# Patient Record
Sex: MALE | Race: WHITE | Employment: OTHER | URBAN - METROPOLITAN AREA
[De-identification: names, ages, dates, MRNs, and addresses within clinical notes are randomized per-mention and may not be internally consistent; named-entity substitution may affect disease eponyms.]

---

## 2017-04-18 ENCOUNTER — APPOINTMENT (OUTPATIENT)
Dept: CT IMAGING | Age: 72
End: 2017-04-18
Attending: EMERGENCY MEDICINE
Payer: MEDICARE

## 2017-04-18 ENCOUNTER — HOSPITAL ENCOUNTER (EMERGENCY)
Age: 72
Discharge: HOME | End: 2017-04-18
Attending: EMERGENCY MEDICINE
Payer: MEDICARE

## 2017-04-18 VITALS
OXYGEN SATURATION: 96 % | DIASTOLIC BLOOD PRESSURE: 74 MMHG | BODY MASS INDEX: 25.33 KG/M2 | WEIGHT: 187 LBS | HEART RATE: 69 BPM | HEIGHT: 72 IN | RESPIRATION RATE: 16 BRPM | SYSTOLIC BLOOD PRESSURE: 133 MMHG | TEMPERATURE: 98.2 F

## 2017-04-18 DIAGNOSIS — F10.920 ALCOHOL INTOXICATION, UNCOMPLICATED (HCC): Primary | ICD-10-CM

## 2017-04-18 DIAGNOSIS — T14.8XXA ABRASION: ICD-10-CM
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
## 2017-04-18 LAB
ALBUMIN SERPL BCP-MCNC: 3.7 G/DL (ref 3.5–5)
ALBUMIN/GLOB SERPL: 1.2 {RATIO} (ref 1.1–2.2)
ALP SERPL-CCNC: 64 U/L (ref 45–117)
ALT SERPL-CCNC: 27 U/L (ref 12–78)
ANION GAP BLD CALC-SCNC: 9 MMOL/L (ref 5–15)
AST SERPL W P-5'-P-CCNC: 25 U/L (ref 15–37)
BASOPHILS # BLD AUTO: 0 K/UL (ref 0–0.1)
BASOPHILS # BLD: 0 % (ref 0–1)
BILIRUB SERPL-MCNC: 1 MG/DL (ref 0.2–1)
BUN SERPL-MCNC: 18 MG/DL (ref 6–20)
BUN/CREAT SERPL: 15 (ref 12–20)
CALCIUM SERPL-MCNC: 8.9 MG/DL (ref 8.5–10.1)
CHLORIDE SERPL-SCNC: 106 MMOL/L (ref 97–108)
CO2 SERPL-SCNC: 27 MMOL/L (ref 21–32)
CREAT SERPL-MCNC: 1.21 MG/DL (ref 0.7–1.3)
EOSINOPHIL # BLD: 0.1 K/UL (ref 0–0.4)
EOSINOPHIL NFR BLD: 1 % (ref 0–7)
ERYTHROCYTE [DISTWIDTH] IN BLOOD BY AUTOMATED COUNT: 13.1 % (ref 11.5–14.5)
ETHANOL SERPL-MCNC: 343 MG/DL
GLOBULIN SER CALC-MCNC: 3 G/DL (ref 2–4)
GLUCOSE SERPL-MCNC: 82 MG/DL (ref 65–100)
HCT VFR BLD AUTO: 44.3 % (ref 36.6–50.3)
HGB BLD-MCNC: 14.5 G/DL (ref 12.1–17)
LYMPHOCYTES # BLD AUTO: 28 % (ref 12–49)
LYMPHOCYTES # BLD: 2.3 K/UL (ref 0.8–3.5)
MCH RBC QN AUTO: 33.8 PG (ref 26–34)
MCHC RBC AUTO-ENTMCNC: 32.7 G/DL (ref 30–36.5)
MCV RBC AUTO: 103.3 FL (ref 80–99)
MONOCYTES # BLD: 0.3 K/UL (ref 0–1)
MONOCYTES NFR BLD AUTO: 4 % (ref 5–13)
NEUTS SEG # BLD: 5.5 K/UL (ref 1.8–8)
NEUTS SEG NFR BLD AUTO: 67 % (ref 32–75)
PLATELET # BLD AUTO: 160 K/UL (ref 150–400)
POTASSIUM SERPL-SCNC: 4.1 MMOL/L (ref 3.5–5.1)
PROT SERPL-MCNC: 6.7 G/DL (ref 6.4–8.2)
RBC # BLD AUTO: 4.29 M/UL (ref 4.1–5.7)
SODIUM SERPL-SCNC: 142 MMOL/L (ref 136–145)
TROPONIN I SERPL-MCNC: <0.04 NG/ML
WBC # BLD AUTO: 8.3 K/UL (ref 4.1–11.1)

## 2017-04-18 PROCEDURE — 72125 CT NECK SPINE W/O DYE: CPT

## 2017-04-18 PROCEDURE — 84484 ASSAY OF TROPONIN QUANT: CPT | Performed by: EMERGENCY MEDICINE

## 2017-04-18 PROCEDURE — 99284 EMERGENCY DEPT VISIT MOD MDM: CPT

## 2017-04-18 PROCEDURE — 80307 DRUG TEST PRSMV CHEM ANLYZR: CPT | Performed by: EMERGENCY MEDICINE

## 2017-04-18 PROCEDURE — 70450 CT HEAD/BRAIN W/O DYE: CPT

## 2017-04-18 PROCEDURE — 36415 COLL VENOUS BLD VENIPUNCTURE: CPT | Performed by: EMERGENCY MEDICINE

## 2017-04-18 PROCEDURE — 80053 COMPREHEN METABOLIC PANEL: CPT | Performed by: EMERGENCY MEDICINE

## 2017-04-18 PROCEDURE — 85025 COMPLETE CBC W/AUTO DIFF WBC: CPT | Performed by: EMERGENCY MEDICINE

## 2017-04-18 RX ORDER — ATORVASTATIN CALCIUM 20 MG/1
20 TABLET, FILM COATED ORAL DAILY
COMMUNITY
End: 2017-08-07

## 2017-04-18 NOTE — ED NOTES
Pt wheeled out of ED with discharge instructions and prescriptions in hand given by Dr. Rudolph Oh; pt verbalized understanding of discharge paperwork and time allotted for questions. VSS. Pt alert and oriented.
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
## 2017-04-18 NOTE — ED NOTES
Patient assisted with urinal he is extremely unsteady on his feet. Returned to bed without incident. Monitor x 2 reapplied. Family returned to bedside.

## 2017-08-07 ENCOUNTER — HOSPITAL ENCOUNTER (OUTPATIENT)
Dept: PREADMISSION TESTING | Age: 72
Discharge: HOME | End: 2017-08-07
Payer: MEDICARE

## 2017-08-07 VITALS
WEIGHT: 200 LBS | HEART RATE: 58 BPM | DIASTOLIC BLOOD PRESSURE: 70 MMHG | SYSTOLIC BLOOD PRESSURE: 133 MMHG | OXYGEN SATURATION: 96 % | HEIGHT: 73 IN | BODY MASS INDEX: 26.51 KG/M2 | TEMPERATURE: 98 F

## 2017-08-07 LAB
ABO + RH BLD: NORMAL
ANION GAP BLD CALC-SCNC: 5 MMOL/L (ref 5–15)
APPEARANCE UR: CLEAR
ATRIAL RATE: 62 BPM
BACTERIA URNS QL MICRO: NEGATIVE /HPF
BILIRUB UR QL: NEGATIVE
BLOOD GROUP ANTIBODIES SERPL: NORMAL
BUN SERPL-MCNC: 17 MG/DL (ref 6–20)
BUN/CREAT SERPL: 12 (ref 12–20)
CALCIUM SERPL-MCNC: 9.6 MG/DL (ref 8.5–10.1)
CALCULATED P AXIS, ECG09: 9 DEGREES
CALCULATED R AXIS, ECG10: 13 DEGREES
CALCULATED T AXIS, ECG11: 21 DEGREES
CHLORIDE SERPL-SCNC: 105 MMOL/L (ref 97–108)
CO2 SERPL-SCNC: 30 MMOL/L (ref 21–32)
COLOR UR: NORMAL
CREAT SERPL-MCNC: 1.45 MG/DL (ref 0.7–1.3)
DIAGNOSIS, 93000: NORMAL
EPITH CASTS URNS QL MICRO: NORMAL /LPF
ERYTHROCYTE [DISTWIDTH] IN BLOOD BY AUTOMATED COUNT: 12.9 % (ref 11.5–14.5)
EST. AVERAGE GLUCOSE BLD GHB EST-MCNC: 108 MG/DL
GLUCOSE SERPL-MCNC: 87 MG/DL (ref 65–100)
GLUCOSE UR STRIP.AUTO-MCNC: NEGATIVE MG/DL
HBA1C MFR BLD: 5.4 % (ref 4.2–6.3)
HCT VFR BLD AUTO: 45 % (ref 36.6–50.3)
HGB BLD-MCNC: 14.7 G/DL (ref 12.1–17)
HGB UR QL STRIP: NEGATIVE
HYALINE CASTS URNS QL MICRO: NORMAL /LPF (ref 0–5)
INR PPP: 1.1 (ref 0.9–1.1)
KETONES UR QL STRIP.AUTO: NEGATIVE MG/DL
LEUKOCYTE ESTERASE UR QL STRIP.AUTO: NEGATIVE
MCH RBC QN AUTO: 31.7 PG (ref 26–34)
MCHC RBC AUTO-ENTMCNC: 32.7 G/DL (ref 30–36.5)
MCV RBC AUTO: 97.2 FL (ref 80–99)
NITRITE UR QL STRIP.AUTO: NEGATIVE
P-R INTERVAL, ECG05: 178 MS
PH UR STRIP: 6 [PH] (ref 5–8)
PLATELET # BLD AUTO: 172 K/UL (ref 150–400)
POTASSIUM SERPL-SCNC: 4.5 MMOL/L (ref 3.5–5.1)
PROT UR STRIP-MCNC: NEGATIVE MG/DL
PROTHROMBIN TIME: 10.9 SEC (ref 9–11.1)
Q-T INTERVAL, ECG07: 400 MS
QRS DURATION, ECG06: 90 MS
QTC CALCULATION (BEZET), ECG08: 406 MS
RBC # BLD AUTO: 4.63 M/UL (ref 4.1–5.7)
RBC #/AREA URNS HPF: NORMAL /HPF (ref 0–5)
SODIUM SERPL-SCNC: 140 MMOL/L (ref 136–145)
SP GR UR REFRACTOMETRY: 1.02 (ref 1–1.03)
SPECIMEN EXP DATE BLD: NORMAL
UA: UC IF INDICATED,UAUC: NORMAL
UROBILINOGEN UR QL STRIP.AUTO: 0.2 EU/DL (ref 0.2–1)
VENTRICULAR RATE, ECG03: 62 BPM
WBC # BLD AUTO: 6.3 K/UL (ref 4.1–11.1)
WBC URNS QL MICRO: NORMAL /HPF (ref 0–4)

## 2017-08-07 PROCEDURE — 85610 PROTHROMBIN TIME: CPT | Performed by: ORTHOPAEDIC SURGERY

## 2017-08-07 PROCEDURE — 86900 BLOOD TYPING SEROLOGIC ABO: CPT | Performed by: ORTHOPAEDIC SURGERY

## 2017-08-07 PROCEDURE — 83036 HEMOGLOBIN GLYCOSYLATED A1C: CPT | Performed by: ORTHOPAEDIC SURGERY

## 2017-08-07 PROCEDURE — 36415 COLL VENOUS BLD VENIPUNCTURE: CPT | Performed by: ORTHOPAEDIC SURGERY

## 2017-08-07 PROCEDURE — 81001 URINALYSIS AUTO W/SCOPE: CPT | Performed by: ORTHOPAEDIC SURGERY

## 2017-08-07 PROCEDURE — 93005 ELECTROCARDIOGRAM TRACING: CPT

## 2017-08-07 PROCEDURE — 80048 BASIC METABOLIC PNL TOTAL CA: CPT | Performed by: ORTHOPAEDIC SURGERY

## 2017-08-07 PROCEDURE — 85027 COMPLETE CBC AUTOMATED: CPT | Performed by: ORTHOPAEDIC SURGERY

## 2017-08-07 RX ORDER — NAPROXEN SODIUM 220 MG
220 TABLET ORAL AS NEEDED
COMMUNITY
End: 2017-08-24

## 2017-08-07 RX ORDER — TERAZOSIN 10 MG/1
10 CAPSULE ORAL
COMMUNITY

## 2017-08-07 RX ORDER — ATORVASTATIN CALCIUM 10 MG/1
10 TABLET, FILM COATED ORAL DAILY
COMMUNITY

## 2017-08-07 RX ORDER — BISMUTH SUBSALICYLATE 262 MG
1 TABLET,CHEWABLE ORAL DAILY
COMMUNITY

## 2017-08-07 RX ORDER — DONEPEZIL HYDROCHLORIDE 5 MG/1
5 TABLET, FILM COATED ORAL DAILY
COMMUNITY

## 2017-08-07 RX ORDER — CHOLECALCIFEROL (VITAMIN D3) 125 MCG
1 CAPSULE ORAL DAILY
COMMUNITY

## 2017-08-07 RX ORDER — BUPROPION HYDROCHLORIDE 150 MG/1
150 TABLET, EXTENDED RELEASE ORAL DAILY
COMMUNITY

## 2017-08-07 RX ORDER — ESCITALOPRAM OXALATE 20 MG/1
20 TABLET ORAL DAILY
COMMUNITY

## 2017-08-07 RX ORDER — UREA 10 %
800 LOTION (ML) TOPICAL DAILY
COMMUNITY
End: 2017-08-07

## 2017-08-07 RX ORDER — LANOLIN ALCOHOL/MO/W.PET/CERES
400 CREAM (GRAM) TOPICAL DAILY
COMMUNITY

## 2017-08-07 NOTE — PERIOP NOTES
Preoperative instructions reviewed with patient. Patient given six pack of CHG wipes. Instructions  to be reviewed in class on use of CHG wipes. Patient given SSI infection sheet and hand washing tips sheets. MRSA/MSSA treatment instruction sheet given with an explanation to patient that they  will be notified if treatment instructions need to be initiated. Patient was given the opportunity to ask questions on the information provided.

## 2017-08-08 LAB
BACTERIA SPEC CULT: NORMAL
BACTERIA SPEC CULT: NORMAL
SERVICE CMNT-IMP: NORMAL

## 2017-08-17 RX ORDER — PREGABALIN 75 MG/1
75 CAPSULE ORAL ONCE
Status: CANCELLED | OUTPATIENT
Start: 2017-08-17 | End: 2017-08-17

## 2017-08-17 RX ORDER — DEXAMETHASONE SODIUM PHOSPHATE 100 MG/10ML
10 INJECTION INTRAMUSCULAR; INTRAVENOUS ONCE
Status: CANCELLED | OUTPATIENT
Start: 2017-08-17 | End: 2017-08-17

## 2017-08-17 RX ORDER — CEFAZOLIN SODIUM IN 0.9 % NACL 2 G/50 ML
2 INTRAVENOUS SOLUTION, PIGGYBACK (ML) INTRAVENOUS ONCE
Status: CANCELLED | OUTPATIENT
Start: 2017-08-17 | End: 2017-08-17

## 2017-08-17 RX ORDER — ACETAMINOPHEN 500 MG
1000 TABLET ORAL ONCE
Status: CANCELLED | OUTPATIENT
Start: 2017-08-17 | End: 2017-08-17

## 2017-08-22 ENCOUNTER — HOSPITAL ENCOUNTER (INPATIENT)
Age: 72
LOS: 2 days | Discharge: HOME HEALTH | DRG: 470 | End: 2017-08-24
Attending: ORTHOPAEDIC SURGERY | Admitting: ORTHOPAEDIC SURGERY
Payer: MEDICARE

## 2017-08-22 ENCOUNTER — ANESTHESIA (OUTPATIENT)
Dept: SURGERY | Age: 72
DRG: 470 | End: 2017-08-22
Payer: MEDICARE

## 2017-08-22 ENCOUNTER — ANESTHESIA EVENT (OUTPATIENT)
Dept: SURGERY | Age: 72
DRG: 470 | End: 2017-08-22
Payer: MEDICARE

## 2017-08-22 PROBLEM — M17.11 PRIMARY LOCALIZED OSTEOARTHRITIS OF RIGHT KNEE: Status: ACTIVE | Noted: 2017-08-22

## 2017-08-22 LAB
ABO + RH BLD: NORMAL
BLOOD GROUP ANTIBODIES SERPL: NORMAL
GLUCOSE BLD STRIP.AUTO-MCNC: 92 MG/DL (ref 65–100)
SERVICE CMNT-IMP: NORMAL
SPECIMEN EXP DATE BLD: NORMAL

## 2017-08-22 PROCEDURE — 77030035230: Performed by: ORTHOPAEDIC SURGERY

## 2017-08-22 PROCEDURE — 74011000250 HC RX REV CODE- 250: Performed by: ORTHOPAEDIC SURGERY

## 2017-08-22 PROCEDURE — 97161 PT EVAL LOW COMPLEX 20 MIN: CPT

## 2017-08-22 PROCEDURE — 74011000258 HC RX REV CODE- 258: Performed by: ORTHOPAEDIC SURGERY

## 2017-08-22 PROCEDURE — 74011250637 HC RX REV CODE- 250/637: Performed by: ORTHOPAEDIC SURGERY

## 2017-08-22 PROCEDURE — 76210000016 HC OR PH I REC 1 TO 1.5 HR: Performed by: ORTHOPAEDIC SURGERY

## 2017-08-22 PROCEDURE — 77030020782 HC GWN BAIR PAWS FLX 3M -B

## 2017-08-22 PROCEDURE — 77030011640 HC PAD GRND REM COVD -A: Performed by: ORTHOPAEDIC SURGERY

## 2017-08-22 PROCEDURE — 74011250636 HC RX REV CODE- 250/636: Performed by: ANESTHESIOLOGY

## 2017-08-22 PROCEDURE — 77030033067 HC SUT PDO STRATFX SPIR J&J -B: Performed by: ORTHOPAEDIC SURGERY

## 2017-08-22 PROCEDURE — 3E0T3CZ INTRODUCE REGIONAL ANESTH IN PERIPH NRV, PLEXI, PERC: ICD-10-PCS | Performed by: ANESTHESIOLOGY

## 2017-08-22 PROCEDURE — 76010000172 HC OR TIME 2.5 TO 3 HR INTENSV-TIER 1: Performed by: ORTHOPAEDIC SURGERY

## 2017-08-22 PROCEDURE — 82962 GLUCOSE BLOOD TEST: CPT

## 2017-08-22 PROCEDURE — 74011250636 HC RX REV CODE- 250/636: Performed by: ORTHOPAEDIC SURGERY

## 2017-08-22 PROCEDURE — 77030020365 HC SOL INJ SOD CL 0.9% 50ML: Performed by: ORTHOPAEDIC SURGERY

## 2017-08-22 PROCEDURE — C1713 ANCHOR/SCREW BN/BN,TIS/BN: HCPCS | Performed by: ORTHOPAEDIC SURGERY

## 2017-08-22 PROCEDURE — 97116 GAIT TRAINING THERAPY: CPT

## 2017-08-22 PROCEDURE — 77030018846 HC SOL IRR STRL H20 ICUM -A: Performed by: ORTHOPAEDIC SURGERY

## 2017-08-22 PROCEDURE — 74011000250 HC RX REV CODE- 250

## 2017-08-22 PROCEDURE — 77030020788: Performed by: ORTHOPAEDIC SURGERY

## 2017-08-22 PROCEDURE — G8978 MOBILITY CURRENT STATUS: HCPCS

## 2017-08-22 PROCEDURE — 77030012935 HC DRSG AQUACEL BMS -B: Performed by: ORTHOPAEDIC SURGERY

## 2017-08-22 PROCEDURE — 77030018836 HC SOL IRR NACL ICUM -A: Performed by: ORTHOPAEDIC SURGERY

## 2017-08-22 PROCEDURE — 77030013079 HC BLNKT BAIR HGGR 3M -A: Performed by: NURSE ANESTHETIST, CERTIFIED REGISTERED

## 2017-08-22 PROCEDURE — 77030016547 HC BLD SAW SAG1 STRY -B: Performed by: ORTHOPAEDIC SURGERY

## 2017-08-22 PROCEDURE — 77030007866 HC KT SPN ANES BBMI -B: Performed by: NURSE ANESTHETIST, CERTIFIED REGISTERED

## 2017-08-22 PROCEDURE — 77030002933 HC SUT MCRYL J&J -A: Performed by: ORTHOPAEDIC SURGERY

## 2017-08-22 PROCEDURE — 77030031139 HC SUT VCRL2 J&J -A: Performed by: ORTHOPAEDIC SURGERY

## 2017-08-22 PROCEDURE — 76060000036 HC ANESTHESIA 2.5 TO 3 HR: Performed by: ORTHOPAEDIC SURGERY

## 2017-08-22 PROCEDURE — 77030003601 HC NDL NRV BLK BBMI -A

## 2017-08-22 PROCEDURE — 0SRC0J9 REPLACEMENT OF RIGHT KNEE JOINT WITH SYNTHETIC SUBSTITUTE, CEMENTED, OPEN APPROACH: ICD-10-PCS | Performed by: ORTHOPAEDIC SURGERY

## 2017-08-22 PROCEDURE — 74011250636 HC RX REV CODE- 250/636

## 2017-08-22 PROCEDURE — 74011250636 HC RX REV CODE- 250/636: Performed by: NURSE PRACTITIONER

## 2017-08-22 PROCEDURE — 86900 BLOOD TYPING SEROLOGIC ABO: CPT | Performed by: ORTHOPAEDIC SURGERY

## 2017-08-22 PROCEDURE — 36415 COLL VENOUS BLD VENIPUNCTURE: CPT | Performed by: ORTHOPAEDIC SURGERY

## 2017-08-22 PROCEDURE — 65270000029 HC RM PRIVATE

## 2017-08-22 PROCEDURE — 77030010507 HC ADH SKN DERMBND J&J -B: Performed by: ORTHOPAEDIC SURGERY

## 2017-08-22 PROCEDURE — 77030000032 HC CUF TRNQT ZIMM -B: Performed by: ORTHOPAEDIC SURGERY

## 2017-08-22 PROCEDURE — G8979 MOBILITY GOAL STATUS: HCPCS

## 2017-08-22 PROCEDURE — 77030014077 HC TOWER MX CEM J&J -C: Performed by: ORTHOPAEDIC SURGERY

## 2017-08-22 PROCEDURE — C1776 JOINT DEVICE (IMPLANTABLE): HCPCS | Performed by: ORTHOPAEDIC SURGERY

## 2017-08-22 PROCEDURE — C9290 INJ, BUPIVACAINE LIPOSOME: HCPCS | Performed by: ORTHOPAEDIC SURGERY

## 2017-08-22 DEVICE — CEMENT BNE GENTAMICIN 40 GM SMARTSET GMV: Type: IMPLANTABLE DEVICE | Site: KNEE | Status: FUNCTIONAL

## 2017-08-22 DEVICE — COMPONENT PAT DIA41MM POLYETH DOME CEM MEDIALIZED ATTUNE: Type: IMPLANTABLE DEVICE | Site: KNEE | Status: FUNCTIONAL

## 2017-08-22 DEVICE — INSERT TIB RP FEM KNEE CEM: Type: IMPLANTABLE DEVICE | Status: FUNCTIONAL

## 2017-08-22 DEVICE — COMPONENT FEM SZ 9 R KNEE POST STBL CEM ATTUNE: Type: IMPLANTABLE DEVICE | Site: KNEE | Status: FUNCTIONAL

## 2017-08-22 DEVICE — BASEPLATE TIB SZ 8 KNEE CEM FIX BEAR ATTUNE: Type: IMPLANTABLE DEVICE | Site: KNEE | Status: FUNCTIONAL

## 2017-08-22 RX ORDER — SODIUM CHLORIDE, SODIUM LACTATE, POTASSIUM CHLORIDE, CALCIUM CHLORIDE 600; 310; 30; 20 MG/100ML; MG/100ML; MG/100ML; MG/100ML
125 INJECTION, SOLUTION INTRAVENOUS CONTINUOUS
Status: DISCONTINUED | OUTPATIENT
Start: 2017-08-22 | End: 2017-08-22 | Stop reason: HOSPADM

## 2017-08-22 RX ORDER — DONEPEZIL HYDROCHLORIDE 5 MG/1
5 TABLET, FILM COATED ORAL DAILY
Status: DISCONTINUED | OUTPATIENT
Start: 2017-08-23 | End: 2017-08-24 | Stop reason: HOSPADM

## 2017-08-22 RX ORDER — MIDAZOLAM HYDROCHLORIDE 1 MG/ML
INJECTION, SOLUTION INTRAMUSCULAR; INTRAVENOUS AS NEEDED
Status: DISCONTINUED | OUTPATIENT
Start: 2017-08-22 | End: 2017-08-22 | Stop reason: HOSPADM

## 2017-08-22 RX ORDER — SODIUM CHLORIDE 0.9 % (FLUSH) 0.9 %
5-10 SYRINGE (ML) INJECTION EVERY 8 HOURS
Status: DISCONTINUED | OUTPATIENT
Start: 2017-08-23 | End: 2017-08-24 | Stop reason: HOSPADM

## 2017-08-22 RX ORDER — FACIAL-BODY WIPES
10 EACH TOPICAL DAILY PRN
Status: DISCONTINUED | OUTPATIENT
Start: 2017-08-24 | End: 2017-08-24 | Stop reason: HOSPADM

## 2017-08-22 RX ORDER — BUPIVACAINE HYDROCHLORIDE 5 MG/ML
INJECTION, SOLUTION EPIDURAL; INTRACAUDAL AS NEEDED
Status: DISCONTINUED | OUTPATIENT
Start: 2017-08-22 | End: 2017-08-22 | Stop reason: HOSPADM

## 2017-08-22 RX ORDER — ONDANSETRON 2 MG/ML
INJECTION INTRAMUSCULAR; INTRAVENOUS AS NEEDED
Status: DISCONTINUED | OUTPATIENT
Start: 2017-08-22 | End: 2017-08-22 | Stop reason: HOSPADM

## 2017-08-22 RX ORDER — ACETAMINOPHEN 500 MG
1000 TABLET ORAL ONCE
Status: COMPLETED | OUTPATIENT
Start: 2017-08-22 | End: 2017-08-22

## 2017-08-22 RX ORDER — ASPIRIN 325 MG
325 TABLET, DELAYED RELEASE (ENTERIC COATED) ORAL 2 TIMES DAILY
Status: DISCONTINUED | OUTPATIENT
Start: 2017-08-22 | End: 2017-08-24 | Stop reason: HOSPADM

## 2017-08-22 RX ORDER — LANOLIN ALCOHOL/MO/W.PET/CERES
400 CREAM (GRAM) TOPICAL DAILY
Status: DISCONTINUED | OUTPATIENT
Start: 2017-08-23 | End: 2017-08-24 | Stop reason: HOSPADM

## 2017-08-22 RX ORDER — SODIUM CHLORIDE 9 MG/ML
125 INJECTION, SOLUTION INTRAVENOUS CONTINUOUS
Status: DISCONTINUED | OUTPATIENT
Start: 2017-08-22 | End: 2017-08-22

## 2017-08-22 RX ORDER — ACETAMINOPHEN 325 MG/1
325-650 TABLET ORAL
Status: DISCONTINUED | OUTPATIENT
Start: 2017-08-22 | End: 2017-08-24 | Stop reason: HOSPADM

## 2017-08-22 RX ORDER — ACETAMINOPHEN 500 MG
500 TABLET ORAL
Status: DISCONTINUED | OUTPATIENT
Start: 2017-08-22 | End: 2017-08-24 | Stop reason: HOSPADM

## 2017-08-22 RX ORDER — LIDOCAINE HYDROCHLORIDE 10 MG/ML
0.1 INJECTION, SOLUTION EPIDURAL; INFILTRATION; INTRACAUDAL; PERINEURAL AS NEEDED
Status: DISCONTINUED | OUTPATIENT
Start: 2017-08-22 | End: 2017-08-22 | Stop reason: HOSPADM

## 2017-08-22 RX ORDER — PREGABALIN 75 MG/1
75 CAPSULE ORAL ONCE
Status: COMPLETED | OUTPATIENT
Start: 2017-08-22 | End: 2017-08-22

## 2017-08-22 RX ORDER — CEFAZOLIN SODIUM IN 0.9 % NACL 2 G/50 ML
2 INTRAVENOUS SOLUTION, PIGGYBACK (ML) INTRAVENOUS ONCE
Status: COMPLETED | OUTPATIENT
Start: 2017-08-22 | End: 2017-08-22

## 2017-08-22 RX ORDER — HYDROMORPHONE HYDROCHLORIDE 1 MG/ML
0.2 INJECTION, SOLUTION INTRAMUSCULAR; INTRAVENOUS; SUBCUTANEOUS
Status: DISCONTINUED | OUTPATIENT
Start: 2017-08-22 | End: 2017-08-22 | Stop reason: HOSPADM

## 2017-08-22 RX ORDER — SODIUM CHLORIDE 9 MG/ML
125 INJECTION, SOLUTION INTRAVENOUS CONTINUOUS
Status: DISPENSED | OUTPATIENT
Start: 2017-08-22 | End: 2017-08-23

## 2017-08-22 RX ORDER — MIDAZOLAM HYDROCHLORIDE 1 MG/ML
1 INJECTION, SOLUTION INTRAMUSCULAR; INTRAVENOUS AS NEEDED
Status: DISCONTINUED | OUTPATIENT
Start: 2017-08-22 | End: 2017-08-22 | Stop reason: HOSPADM

## 2017-08-22 RX ORDER — PROPOFOL 10 MG/ML
VIAL (ML) INTRAVENOUS
Status: DISCONTINUED | OUTPATIENT
Start: 2017-08-22 | End: 2017-08-22 | Stop reason: HOSPADM

## 2017-08-22 RX ORDER — MIDAZOLAM HYDROCHLORIDE 1 MG/ML
0.5 INJECTION, SOLUTION INTRAMUSCULAR; INTRAVENOUS
Status: DISCONTINUED | OUTPATIENT
Start: 2017-08-22 | End: 2017-08-22 | Stop reason: HOSPADM

## 2017-08-22 RX ORDER — BUPROPION HYDROCHLORIDE 150 MG/1
150 TABLET, EXTENDED RELEASE ORAL DAILY
Status: DISCONTINUED | OUTPATIENT
Start: 2017-08-23 | End: 2017-08-24 | Stop reason: HOSPADM

## 2017-08-22 RX ORDER — FENTANYL CITRATE 50 UG/ML
50 INJECTION, SOLUTION INTRAMUSCULAR; INTRAVENOUS AS NEEDED
Status: DISCONTINUED | OUTPATIENT
Start: 2017-08-22 | End: 2017-08-22 | Stop reason: HOSPADM

## 2017-08-22 RX ORDER — DEXAMETHASONE SODIUM PHOSPHATE 10 MG/ML
10 INJECTION INTRAMUSCULAR; INTRAVENOUS ONCE
Status: COMPLETED | OUTPATIENT
Start: 2017-08-22 | End: 2017-08-22

## 2017-08-22 RX ORDER — SODIUM CHLORIDE 0.9 % (FLUSH) 0.9 %
5-10 SYRINGE (ML) INJECTION EVERY 8 HOURS
Status: DISCONTINUED | OUTPATIENT
Start: 2017-08-22 | End: 2017-08-22 | Stop reason: HOSPADM

## 2017-08-22 RX ORDER — POLYETHYLENE GLYCOL 3350 17 G/17G
17 POWDER, FOR SOLUTION ORAL DAILY
Status: DISCONTINUED | OUTPATIENT
Start: 2017-08-23 | End: 2017-08-24 | Stop reason: HOSPADM

## 2017-08-22 RX ORDER — DIPHENHYDRAMINE HYDROCHLORIDE 50 MG/ML
12.5 INJECTION, SOLUTION INTRAMUSCULAR; INTRAVENOUS AS NEEDED
Status: DISCONTINUED | OUTPATIENT
Start: 2017-08-22 | End: 2017-08-22 | Stop reason: HOSPADM

## 2017-08-22 RX ORDER — MORPHINE SULFATE 10 MG/ML
2 INJECTION, SOLUTION INTRAMUSCULAR; INTRAVENOUS
Status: DISCONTINUED | OUTPATIENT
Start: 2017-08-22 | End: 2017-08-22 | Stop reason: HOSPADM

## 2017-08-22 RX ORDER — OXYCODONE HYDROCHLORIDE 5 MG/1
5 TABLET ORAL
Status: DISCONTINUED | OUTPATIENT
Start: 2017-08-22 | End: 2017-08-24 | Stop reason: HOSPADM

## 2017-08-22 RX ORDER — DEXAMETHASONE SODIUM PHOSPHATE 4 MG/ML
10 INJECTION, SOLUTION INTRA-ARTICULAR; INTRALESIONAL; INTRAMUSCULAR; INTRAVENOUS; SOFT TISSUE ONCE
Status: COMPLETED | OUTPATIENT
Start: 2017-08-23 | End: 2017-08-23

## 2017-08-22 RX ORDER — OXYCODONE AND ACETAMINOPHEN 5; 325 MG/1; MG/1
1 TABLET ORAL AS NEEDED
Status: DISCONTINUED | OUTPATIENT
Start: 2017-08-22 | End: 2017-08-22 | Stop reason: HOSPADM

## 2017-08-22 RX ORDER — ONDANSETRON 2 MG/ML
4 INJECTION INTRAMUSCULAR; INTRAVENOUS
Status: ACTIVE | OUTPATIENT
Start: 2017-08-22 | End: 2017-08-23

## 2017-08-22 RX ORDER — ESCITALOPRAM OXALATE 10 MG/1
20 TABLET ORAL DAILY
Status: DISCONTINUED | OUTPATIENT
Start: 2017-08-23 | End: 2017-08-24 | Stop reason: HOSPADM

## 2017-08-22 RX ORDER — AMOXICILLIN 250 MG
1 CAPSULE ORAL 2 TIMES DAILY
Status: DISCONTINUED | OUTPATIENT
Start: 2017-08-22 | End: 2017-08-24 | Stop reason: HOSPADM

## 2017-08-22 RX ORDER — PHENYLEPHRINE HCL IN 0.9% NACL 0.4MG/10ML
SYRINGE (ML) INTRAVENOUS AS NEEDED
Status: DISCONTINUED | OUTPATIENT
Start: 2017-08-22 | End: 2017-08-22 | Stop reason: HOSPADM

## 2017-08-22 RX ORDER — CEFAZOLIN SODIUM IN 0.9 % NACL 2 G/50 ML
2 INTRAVENOUS SOLUTION, PIGGYBACK (ML) INTRAVENOUS EVERY 8 HOURS
Status: COMPLETED | OUTPATIENT
Start: 2017-08-22 | End: 2017-08-23

## 2017-08-22 RX ORDER — HYDROXYZINE HYDROCHLORIDE 10 MG/1
10 TABLET, FILM COATED ORAL
Status: DISCONTINUED | OUTPATIENT
Start: 2017-08-22 | End: 2017-08-24 | Stop reason: HOSPADM

## 2017-08-22 RX ORDER — HYDROMORPHONE HYDROCHLORIDE 1 MG/ML
0.5 INJECTION, SOLUTION INTRAMUSCULAR; INTRAVENOUS; SUBCUTANEOUS
Status: DISPENSED | OUTPATIENT
Start: 2017-08-22 | End: 2017-08-23

## 2017-08-22 RX ORDER — NALOXONE HYDROCHLORIDE 0.4 MG/ML
0.4 INJECTION, SOLUTION INTRAMUSCULAR; INTRAVENOUS; SUBCUTANEOUS AS NEEDED
Status: DISCONTINUED | OUTPATIENT
Start: 2017-08-22 | End: 2017-08-24 | Stop reason: HOSPADM

## 2017-08-22 RX ORDER — PROPOFOL 10 MG/ML
INJECTION, EMULSION INTRAVENOUS
Status: DISCONTINUED | OUTPATIENT
Start: 2017-08-22 | End: 2017-08-22 | Stop reason: HOSPADM

## 2017-08-22 RX ORDER — GLYCOPYRROLATE 0.2 MG/ML
INJECTION INTRAMUSCULAR; INTRAVENOUS AS NEEDED
Status: DISCONTINUED | OUTPATIENT
Start: 2017-08-22 | End: 2017-08-22 | Stop reason: HOSPADM

## 2017-08-22 RX ORDER — SODIUM CHLORIDE 0.9 % (FLUSH) 0.9 %
5-10 SYRINGE (ML) INJECTION AS NEEDED
Status: DISCONTINUED | OUTPATIENT
Start: 2017-08-22 | End: 2017-08-22 | Stop reason: HOSPADM

## 2017-08-22 RX ORDER — SODIUM CHLORIDE 9 MG/ML
1000 INJECTION, SOLUTION INTRAVENOUS CONTINUOUS
Status: DISCONTINUED | OUTPATIENT
Start: 2017-08-22 | End: 2017-08-22 | Stop reason: HOSPADM

## 2017-08-22 RX ORDER — ONDANSETRON 2 MG/ML
4 INJECTION INTRAMUSCULAR; INTRAVENOUS AS NEEDED
Status: DISCONTINUED | OUTPATIENT
Start: 2017-08-22 | End: 2017-08-22 | Stop reason: HOSPADM

## 2017-08-22 RX ORDER — FENTANYL CITRATE 50 UG/ML
25 INJECTION, SOLUTION INTRAMUSCULAR; INTRAVENOUS
Status: DISCONTINUED | OUTPATIENT
Start: 2017-08-22 | End: 2017-08-22 | Stop reason: HOSPADM

## 2017-08-22 RX ORDER — SODIUM CHLORIDE 0.9 % (FLUSH) 0.9 %
5-10 SYRINGE (ML) INJECTION AS NEEDED
Status: DISCONTINUED | OUTPATIENT
Start: 2017-08-22 | End: 2017-08-24 | Stop reason: HOSPADM

## 2017-08-22 RX ORDER — SODIUM CHLORIDE 9 MG/ML
50 INJECTION, SOLUTION INTRAVENOUS CONTINUOUS
Status: DISCONTINUED | OUTPATIENT
Start: 2017-08-22 | End: 2017-08-22 | Stop reason: HOSPADM

## 2017-08-22 RX ORDER — ATORVASTATIN CALCIUM 10 MG/1
10 TABLET, FILM COATED ORAL DAILY
Status: DISCONTINUED | OUTPATIENT
Start: 2017-08-23 | End: 2017-08-24 | Stop reason: HOSPADM

## 2017-08-22 RX ORDER — OXYCODONE HYDROCHLORIDE 5 MG/1
2.5 TABLET ORAL
Status: DISCONTINUED | OUTPATIENT
Start: 2017-08-22 | End: 2017-08-24 | Stop reason: HOSPADM

## 2017-08-22 RX ORDER — DEXMEDETOMIDINE HYDROCHLORIDE 4 UG/ML
INJECTION, SOLUTION INTRAVENOUS
Status: DISCONTINUED | OUTPATIENT
Start: 2017-08-22 | End: 2017-08-22 | Stop reason: HOSPADM

## 2017-08-22 RX ADMIN — MIDAZOLAM HYDROCHLORIDE 1 MG: 1 INJECTION, SOLUTION INTRAMUSCULAR; INTRAVENOUS at 07:45

## 2017-08-22 RX ADMIN — Medication 80 MCG: at 10:11

## 2017-08-22 RX ADMIN — ACETAMINOPHEN 1000 MG: 500 TABLET, FILM COATED ORAL at 07:00

## 2017-08-22 RX ADMIN — OXYCODONE HYDROCHLORIDE 5 MG: 5 TABLET ORAL at 15:07

## 2017-08-22 RX ADMIN — ONDANSETRON 4 MG: 2 INJECTION INTRAMUSCULAR; INTRAVENOUS at 07:50

## 2017-08-22 RX ADMIN — ASPIRIN 325 MG: 325 TABLET, DELAYED RELEASE ORAL at 14:38

## 2017-08-22 RX ADMIN — TRANEXAMIC ACID 1 G: 100 INJECTION, SOLUTION INTRAVENOUS at 07:53

## 2017-08-22 RX ADMIN — ACETAMINOPHEN 500 MG: 500 TABLET, FILM COATED ORAL at 14:37

## 2017-08-22 RX ADMIN — CEFAZOLIN 2 G: 1 INJECTION, POWDER, FOR SOLUTION INTRAMUSCULAR; INTRAVENOUS; PARENTERAL at 23:45

## 2017-08-22 RX ADMIN — OXYCODONE HYDROCHLORIDE 5 MG: 5 TABLET ORAL at 17:52

## 2017-08-22 RX ADMIN — DOCUSATE SODIUM AND SENNOSIDES 1 TABLET: 8.6; 5 TABLET, FILM COATED ORAL at 14:38

## 2017-08-22 RX ADMIN — Medication 30 MCG/KG/MIN: at 07:46

## 2017-08-22 RX ADMIN — CEFAZOLIN 2 G: 1 INJECTION, POWDER, FOR SOLUTION INTRAMUSCULAR; INTRAVENOUS; PARENTERAL at 16:49

## 2017-08-22 RX ADMIN — DEXAMETHASONE SODIUM PHOSPHATE 10 MG: 10 INJECTION, SOLUTION INTRAMUSCULAR; INTRAVENOUS at 07:53

## 2017-08-22 RX ADMIN — MIDAZOLAM HYDROCHLORIDE 3 MG: 1 INJECTION, SOLUTION INTRAMUSCULAR; INTRAVENOUS at 07:35

## 2017-08-22 RX ADMIN — SODIUM CHLORIDE, SODIUM LACTATE, POTASSIUM CHLORIDE, AND CALCIUM CHLORIDE: 600; 310; 30; 20 INJECTION, SOLUTION INTRAVENOUS at 08:27

## 2017-08-22 RX ADMIN — FENTANYL CITRATE 50 MCG: 50 INJECTION, SOLUTION INTRAMUSCULAR; INTRAVENOUS at 07:35

## 2017-08-22 RX ADMIN — PROPOFOL 30 MCG/KG/MIN: 10 INJECTION, EMULSION INTRAVENOUS at 07:46

## 2017-08-22 RX ADMIN — CEFAZOLIN 2 G: 1 INJECTION, POWDER, FOR SOLUTION INTRAMUSCULAR; INTRAVENOUS; PARENTERAL at 07:48

## 2017-08-22 RX ADMIN — SODIUM CHLORIDE 125 ML/HR: 900 INJECTION, SOLUTION INTRAVENOUS at 15:00

## 2017-08-22 RX ADMIN — ACETAMINOPHEN 500 MG: 500 TABLET, FILM COATED ORAL at 21:10

## 2017-08-22 RX ADMIN — OXYCODONE HYDROCHLORIDE 5 MG: 5 TABLET ORAL at 23:45

## 2017-08-22 RX ADMIN — DEXMEDETOMIDINE HYDROCHLORIDE 0.4 MCG/KG/HR: 4 INJECTION, SOLUTION INTRAVENOUS at 07:46

## 2017-08-22 RX ADMIN — ACETAMINOPHEN 500 MG: 500 TABLET, FILM COATED ORAL at 17:00

## 2017-08-22 RX ADMIN — PREGABALIN 75 MG: 75 CAPSULE ORAL at 07:00

## 2017-08-22 RX ADMIN — SODIUM CHLORIDE, SODIUM LACTATE, POTASSIUM CHLORIDE, AND CALCIUM CHLORIDE 125 ML/HR: 600; 310; 30; 20 INJECTION, SOLUTION INTRAVENOUS at 07:00

## 2017-08-22 RX ADMIN — GLYCOPYRROLATE 0.2 MG: 0.2 INJECTION INTRAMUSCULAR; INTRAVENOUS at 08:45

## 2017-08-22 RX ADMIN — MIDAZOLAM HYDROCHLORIDE 1 MG: 1 INJECTION, SOLUTION INTRAMUSCULAR; INTRAVENOUS at 07:47

## 2017-08-22 RX ADMIN — OXYCODONE HYDROCHLORIDE 5 MG: 5 TABLET ORAL at 21:10

## 2017-08-22 RX ADMIN — BUPIVACAINE HYDROCHLORIDE 14 MG: 5 INJECTION, SOLUTION EPIDURAL; INTRACAUDAL at 07:49

## 2017-08-22 RX ADMIN — ASPIRIN 325 MG: 325 TABLET, DELAYED RELEASE ORAL at 17:52

## 2017-08-22 NOTE — PROGRESS NOTES
Primary Nurse Michael Jaffe, RN and Martha Nassar RN performed a dual skin assessment on this patient No impairment noted  Fabrizio score is 21

## 2017-08-22 NOTE — PERIOP NOTES
TRANSFER - OUT REPORT:    Verbal report given to Bailey Toscano RN(name) on Clive Matthew MD  being transferred to [facility] for routine post - op       Report consisted of patients Situation, Background, Assessment and   Recommendations(SBAR). Time Pre op antibiotic given:0748  Anesthesia Stop time: 1033  Webb Present on Transfer to floor:yes  Order for Webb on Chart:yes    Information from the following report(s) SBAR, OR Summary, Intake/Output and MAR was reviewed with the receiving nurse. Opportunity for questions and clarification was provided. Is the patient on 02? NO       L/Min        Other     Is the patient on a monitor? NO    Is the nurse transporting with the patient? NO    Surgical Waiting Area notified of patient's transfer from PACU? YES      The following personal items collected during your admission accompanied patient upon transfer:   Dental Appliance: Dental Appliances: None  Vision: Visual Aid: Glasses  Hearing Aid:    Jewelry: Jewelry: None  Clothing: Clothing: At bedside, With patient (in PACU)  Other Valuables:  Other Valuables: Eyeglasses (pt wearing)  Valuables sent to safe:

## 2017-08-22 NOTE — ANESTHESIA PROCEDURE NOTES
Peripheral Block    Start time: 8/22/2017 7:37 AM  End time: 8/22/2017 7:41 AM  Performed by: ERICKSON Correia  Authorized by: ERICKSON Correia       Pre-procedure: Indications: at surgeon's request and post-op pain management    Preanesthetic Checklist: patient identified, risks and benefits discussed, site marked, timeout performed and patient being monitored      Block Type:   Block Type:   Adductor canal  Monitoring:  Standard ASA monitoring, continuous pulse ox, frequent vital sign checks, heart rate, responsive to questions and oxygen  Injection Technique:  Single shot  Procedures: ultrasound guided    Patient Position: supine  Prep: DuraPrep    Needle Type:  Stimuplex  Needle Gauge:  22 G  Needle Localization:  Ultrasound guidance  Medication Injected:  0.5%  ropivacaine  Volume (mL):  20    Assessment:  Number of attempts:  1  Injection Assessment:  Incremental injection every 5 mL, local visualized surrounding nerve on ultrasound, negative aspiration for blood, no paresthesia and no intravascular symptoms  Patient tolerance:  Patient tolerated the procedure well with no immediate complications

## 2017-08-22 NOTE — IP AVS SNAPSHOT
[address] 
[phone] Patient: Hillary MD Esteban 
MRN: [id number] LFN:1/2/[id number] You are allergic to the following No active allergies Recent Documentation Height Weight BMI Smoking Status 1.854 m 86.2 kg 25.07 kg/m2 Former Smoker Emergency Contacts Name Discharge Info Relation Home Work Mobile [address] [facility] CAREGIVER [3] Spouse [3] [phone] Amada Tinajero DISCHARGE CAREGIVER [3] Son [22] [phone] About your hospitalization You were admitted on:  August 22, 2017 You last received care in the:  [facility] You were discharged on:  August 24, 2017 Unit phone number:  [phone] Why you were hospitalized Your primary diagnosis was:  Primary Localized Osteoarthritis Of Right Knee Your diagnoses also included:  Dementia Providers Seen During Your Hospitalizations Provider Role Specialty Primary office phone Romel Chambers MD Attending Provider Orthopedic Surgery [phone] Your Primary Care Physician (PCP) Primary Care Physician Office Phone Office Fax Grace Swartz [phone] Follow-up Information Follow up With Details Comments Contact Info  [facility], please call office if you have not heard from staff member by 12 noon first full day after discharge [address] [place] [address] 
[phone] Carla Mendez MD   [address] [facility] 
[phone] [facility] [phone] Current Discharge Medication List  
  
START taking these medications Dose & Instructions Dispensing Information Comments Morning Noon Evening Bedtime  
 acetaminophen 500 mg tablet Commonly known as:  TYLENOL Your last dose was:     
   
Your next dose is:    
   
   
 Dose:  500 mg  
 Take 1 Tab by mouth every four (4) hours (while awake). Quantity:  100 Tab Refills:  0  
     
   
   
   
  
 aspirin delayed-release 325 mg tablet Your last dose was: Your next dose is:    
   
   
 Dose:  325 mg Take 1 Tab by mouth two (2) times a day. For blood clot prevention. Quantity:  60 Tab Refills:  0  
     
   
   
   
  
 oxyCODONE IR 5 mg immediate release tablet Commonly known as:  Pinafrederick Chávez Your last dose was: Your next dose is:    
   
   
 Dose:  5-10 mg Take 1-2 Tabs by mouth every four (4) hours as needed. Max Daily Amount: 60 mg. Indications: Pain Quantity:  80 Tab Refills:  0  
     
   
   
   
  
 senna-docusate 8.6-50 mg per tablet Commonly known as:  Lonia Amna Your last dose was: Your next dose is:    
   
   
 Dose:  1 Tab Take 1 Tab by mouth two (2) times a day. Indications: constipation Quantity:  60 Tab Refills:  0 CONTINUE these medications which have NOT CHANGED Dose & Instructions Dispensing Information Comments Morning Noon Evening Bedtime  
 atorvastatin 10 mg tablet Commonly known as:  LIPITOR Your last dose was: Your next dose is:    
   
   
 Dose:  10 mg Take 10 mg by mouth daily. Refills:  0  
     
   
   
   
  
 buPROPion  mg SR tablet Commonly known as:  Ravi Formica Your last dose was: Your next dose is:    
   
   
 Dose:  150 mg Take 150 mg by mouth daily. Refills:  0  
     
   
   
   
  
 CALCIUM 600 + D 600-125 mg-unit Tab Generic drug:  calcium-cholecalciferol (d3) Your last dose was: Your next dose is:    
   
   
 Dose:  1 Tab Take 1 Tab by mouth daily. Refills:  0  
     
   
   
   
  
 donepezil 5 mg tablet Commonly known as:  ARICEPT Your last dose was: Your next dose is:    
   
   
 Dose:  5 mg Take 5 mg by mouth daily. Refills:  0 escitalopram oxalate 20 mg tablet Commonly known as:  Sunshine Li Your last dose was: Your next dose is:    
   
   
 Dose:  20 mg Take 20 mg by mouth daily. Refills:  0  
     
   
   
   
  
 folic acid 691 mcg tablet Your last dose was: Your next dose is:    
   
   
 Dose:  400 mcg Take 400 mcg by mouth daily. Refills:  0  
     
   
   
   
  
 multivitamin tablet Commonly known as:  ONE A DAY Your last dose was: Your next dose is:    
   
   
 Dose:  1 Tab Take 1 Tab by mouth daily. Refills:  0  
     
   
   
   
  
 terazosin 10 mg capsule Commonly known as:  HYTRIN Your last dose was: Your next dose is:    
   
   
 Dose:  10 mg Take 10 mg by mouth nightly. Refills:  0  
     
   
   
   
  
 VAYACOG PO Your last dose was: Your next dose is:    
   
   
 Dose:  1 Tab Take 1 Tab by mouth daily. Refills:  0  
     
   
   
   
  
 VITAMIN D3 2,000 unit Tab Generic drug:  cholecalciferol (vitamin D3) Your last dose was: Your next dose is:    
   
   
 Dose:  1 Tab Take 1 Tab by mouth daily. Refills:  0 STOP taking these medications ALEVE 220 mg tablet Generic drug:  naproxen sodium Where to Get Your Medications Information on where to get these meds will be given to you by the nurse or doctor. ! Ask your nurse or doctor about these medications  
  acetaminophen 500 mg tablet  
 aspirin delayed-release 325 mg tablet  
 oxyCODONE IR 5 mg immediate release tablet  
 senna-docusate 8.6-50 mg per tablet Discharge Instructions Patient meets criteria for BUNDLED PAYMENT  
for Care Improvement Initiative Criteria Contact Information for Orthopedic Nurse Navigator:     
ZAIDA Townsend, RN-BC 
D:[phone] H:[phone] V:[phone] After Hospital Care Plan:  Discharge Instructions Knee Replacement-  [facility] 
 
Patient Name: Taishajeannette Dickey MD 
Date of procedure: 8/22/2017 Procedure: Procedure(s): RIGHT TOTAL KNEE REPLACMENT AND REMOVAL OF HARDWEAR Surgeon: Surgeon(s) and Role: Omar Padilla MD - Primary PCP: Florentino Jasso MD 
Date of discharge: No discharge date for patient encounter. Follow up appointments ? Follow up with  [facility] in 4 weeks. Call [phone] to make an appointment. ? If home health has been arranged for you the agency will contact you to arrange dates/times for visits. Please call them if you do not hear from them within 24 hours after you are discharged When to call your Orthopaedic Surgeon: Call [phone]. If you call after 5pm or on a weekend, the on call physician will be contacted ? Unrelieved pain ? Signs of infection-if your incision is red; continues to have drainage; drainage has a foul odor or if you have a persistent fever over 101 degrees ? Signs of a blood clot in your leg-calf pain, tenderness, redness, swelling of lower leg When to call your Primary Care Physician: 
? Concerns about medical conditions such as diabetes, high blood pressure, asthma, congestive heart failure ? Call if blood sugars are elevated, persistent headache or dizziness, coughing or congestion, constipation or diarrhea, burning with urination, abnormal heart rate When to call 591and go to the nearest emergency room ? Acute onset of chest pain, shortness of breath, difficulty breathing Activity ? Weight bearing as tolerated with walker or crutches. Refer to pages 23-31 of your handbook for instructions and pictures ? Complete your Home Exercise Program daily as instructed by your therapist.  Refer to pages 33-41 of your handbook for instructions and pictures ? Get up every one hour and walk (except at night when sleeping) ? Do not drive or operate heavy machinery Incision Care ? The Aquacel (brown, waterproof) surgical dressing is to remain on your knee for 7 days. On the 7th day have someone gently peel the dressing off by carefully lifting the edge and stretching it slightly to break the adhesive seal 
? If you have steri-strips (small, white pieces of tape) on your incision, they may come off when you remove the Aquacel surgical dressing. This is okay. You may now leave your incision open to air ? If your Aquacel dressing comes loose/off before the 7th day, you may replace it with a dry sterile gauze dressing; change it daily. Once you incision is not draining, you may leave it open to air ? You may take a shower with the Aquacel dressing in place. Once the Aquacel is removed, you may shower and get your incision wet but do not submerge your incision under water in a bath tub, hot tub or swimming pool for 6 weeks after surgery. Preventing blood clots ? Take Enteric coated Aspirin (delayed release) one tablet twice a day for one month following surgery Pain management ? Take pain medication as prescribed; decrease the amount you use as your pain lessens ? Avoid alcoholic beverages while taking pain medication ? Please be aware that many medications contain Tylenol. We do not want you to over medicate so please read the information below as a guide. Do not take more than 3 Grams of Tylenol in a 24 hour period. (There are 1000 milligrams in one Gram) o Tylenol 325 mg per tablet (do not take more than 9 tablets in 24 hours) o Tylenol 500 mg per tablet (do not take more than 6 tablets in 24 hours) o Tylenol 650 mg per tablet (do not take more than 4 tablets in 24 hours) ? Elevate your leg (do not bend/flex knee) and place an ice bags on your knee for 15-20 minutes after exercising Diet ? Resume usual diet; drink plenty of fluids; eat foods high in fiber ?  You may want to take a stool softener (such as Senokot-S or Colace) to prevent constipation while you are taking pain medication. If constipation occurs, take a laxative (such as Dulcolax tablets, Milk of Magnesia, or a suppository) Home Health Care Protocol (to be followed by Malini Amaros Leeanna) Nursing-per physicians order ? Complete head to toe assessment, vital signs ? Medication reconciliation ? Review pain management ? Manage chronic medical conditions Physical Therapy-per physicians order Weight bearing status: 
Precautions at Admission: Fall, WBAT Left Side Weight Bearing: Full Right Side Weight Bearing: As tolerated Mobility Status: 
Supine to Sit: Supervision Sit to Stand: Contact guard assistance (verbal cues to push from bed) Sit to Supine: Supervision Gait: 
Distance (ft): 300 Feet (ft) Ambulation - Level of Assistance: Contact guard assistance Assistive Device: Walker, rolling, Gait belt Gait Abnormalities: Decreased step clearance, Antalgic Physical Therapy ? Assessment and evaluation-bed mobility; functional transfers (bed, chair, bathroom, stairs); ambulation with equipment, car transfers, shower transfers, safety and ability to get out of house in the event of an emergency ? Review weight bearing as tolerated, wean from walker or crutches as tolerated ? Discuss pain management ? Review how to do ADLs. Refer to page 42 of patient handbook Home Exercise Program-refer to pages 33-41 of patient handbook for exercises. Discharge Orders None Introducing [facility]! Asha Reich introduces [employer] patient portal. Now you can access parts of your medical record, email your doctor's office, and request medication refills online. 1. In your internet browser, go to [email or web address]. [email or web address] 2. Click on the First Time User? Click Here link in the Sign In box. You will see the New Member Sign Up page. 3. Enter your [employer] Access Code exactly as it appears below.  You will not need to use this code after youve completed the sign-up process. If you do not sign up before the expiration date, you must request a new code. · [employer] Access Code: [email or web address] Expires: 11/5/2017  7:34 AM 
 
4. Enter the last four digits of your Social Security Number (xxxx) and Date of Birth (mm/dd/yyyy) as indicated and click Submit. You will be taken to the next sign-up page. 5. Create a [employer] ID. This will be your [employer] login ID and cannot be changed, so think of one that is secure and easy to remember. 6. Create a [employer] password. You can change your password at any time. 7. Enter your Password Reset Question and Answer. This can be used at a later time if you forget your password. 8. Enter your e-mail address. You will receive e-mail notification when new information is available in [address] 9. Click Sign Up. You can now view and download portions of your medical record. 10. Click the Download Summary menu link to download a portable copy of your medical information. If you have questions, please visit the Frequently Asked Questions section of the [employer] website. Remember, [employer] is NOT to be used for urgent needs. For medical emergencies, dial 911. Now available from your iPhone and Android! General Information Please provide this summary of care documentation to your next provider. Patient Signature:  ____________________________________________________________ Date:  ____________________________________________________________  
  
[place] Provider Signature:  ____________________________________________________________ Date:  ____________________________________________________________

## 2017-08-22 NOTE — PROGRESS NOTES
NP ORTHO PROGRESS NOTE  Post Op day: Day of Surgery      Admit date: 8/22/2017  Date of Surgery: 8/22/2017   Procedures: Procedure(s):  RIGHT TOTAL KNEE REPLACMENT AND REMOVAL OF [address]  Admitting Physician: Juan Hernandez MD   Surgeon: Paris Litten) and Role:     * Juan Hernandez MD - Primary    Chart/Meds/Labs Reviewed  Current Facility-Administered Medications   Medication Dose Route Frequency    [START ON 8/23/2017] atorvastatin (LIPITOR) tablet 10 mg  10 mg Oral DAILY    [START ON 8/23/2017] buPROPion SR (WELLBUTRIN SR) tablet 150 mg  150 mg Oral DAILY    [START ON 8/23/2017] donepezil (ARICEPT) tablet 5 mg  5 mg Oral DAILY    [START ON 8/23/2017] escitalopram oxalate (LEXAPRO) tablet 20 mg  20 mg Oral DAILY    [START ON 3/44/0707] folic acid tablet 0.4 mg  400 mcg Oral DAILY    sodium chloride 0.9 % bolus infusion 500 mL  500 mL IntraVENous ONCE PRN    [START ON 8/23/2017] sodium chloride (NS) flush 5-10 mL  5-10 mL IntraVENous Q8H    sodium chloride (NS) flush 5-10 mL  5-10 mL IntraVENous PRN    acetaminophen (TYLENOL) tablet 500 mg  500 mg Oral Q4HWA    oxyCODONE IR (ROXICODONE) tablet 2.5 mg  2.5 mg Oral Q3H PRN    oxyCODONE IR (ROXICODONE) tablet 5 mg  5 mg Oral Q3H PRN    HYDROmorphone (PF) (DILAUDID) injection 0.5 mg  0.5 mg IntraVENous Q4H PRN    naloxone (NARCAN) injection 0.4 mg  0.4 mg IntraVENous PRN    [START ON 8/23/2017] dexamethasone (DECADRON) 4 mg/mL injection 10 mg  10 mg IntraVENous ONCE    ondansetron (ZOFRAN) injection 4 mg  4 mg IntraVENous Q4H PRN    hydrOXYzine HCl (ATARAX) tablet 10 mg  10 mg Oral Q8H PRN    senna-docusate (PERICOLACE) 8.6-50 mg per tablet 1 Tab  1 Tab Oral BID    [START ON 8/23/2017] polyethylene glycol (MIRALAX) packet 17 g  17 g Oral DAILY    [START ON 8/24/2017] bisacodyl (DULCOLAX) suppository 10 mg  10 mg Rectal DAILY PRN    ceFAZolin in 0.9% NS (ANCEF) IVPB soln 2 g  2 g IntraVENous Q8H    aspirin delayed-release tablet 325 mg  325 mg Oral BID    acetaminophen (TYLENOL) tablet 325-650 mg  325-650 mg Oral BID PRN    0.9% sodium chloride infusion  125 mL/hr IntraVENous CONTINUOUS       Subjective:    Complaints: None  Denies Dizziness, CP, SOB, N/V, Abdominal pain, numbness or tingling of extremities. Able to perform ankle pumps easily. Has not been to [facility] or seen by PT yet. Wife at [facility] with patient. Incisional pain control: Expected postop. No opioids PTA. Oral diet: Tolerating diet well    Objective:  General: Alert,Ox4, cooperative, NAD  HEENT: Atraumatic, PERRL, anicteric sclerae  Lungs: Bilateral expansion. Equal excursion. No accessory muscle use. Gastrointestinal:  Soft, non-tender, non-distended  Extremities:  Neurovasc exam WDL. + DP pulses. Sensation intact to light touch. Motor: + DF/PF          Calves non-tender upon palpation or with passive stretch. No significant erythema or swelling. Ace-wrapped Dressing: clean, dry and intact.     Vital Signs:   Visit Vitals    /74 (BP 1 Location: Right arm, BP Patient Position: At rest)    Pulse 86    Temp 98 °F (36.7 °C)    Resp 16    Ht 6' 1\" (1.854 m)    Wt 86.2 kg (190 lb)    SpO2 97%    BMI 25.07 kg/m2    O2 Flow Rate (L/min): 2 l/min O2 Device: Room air   Patient Vitals for the past 24 hrs:   BP Temp Pulse Resp SpO2 Height Weight   08/22/17 1332 115/74 98 °F (36.7 °C) 86 16 97 % - -   08/22/17 1244 121/74 97.5 °F (36.4 °C) 72 16 96 % - -   08/22/17 1215 - - 77 14 97 % - -   08/22/17 1200 118/75 - 75 17 95 % - -   08/22/17 1145 122/74 - 78 15 94 % - -   08/22/17 1130 111/67 - 71 13 93 % - -   08/22/17 1115 110/70 - 70 12 97 % - -   08/22/17 1110 - 97.8 °F (36.6 °C) 72 16 98 % - -   08/22/17 1100 101/78 - 67 16 97 % - -   08/22/17 1045 110/66 - 69 16 96 % - -   08/22/17 1040 103/73 - 73 17 96 % - -   08/22/17 1032 108/64 97.4 °F (36.3 °C) 70 15 97 % - -   08/22/17 0619 142/86 98.1 °F (36.7 °C) 77 18 100 % 6' 1\" (1.854 m) 86.2 kg (190 lb)     Temp (24hrs), Av.8 °F (36.6 °C), Min:97.4 °F (36.3 °C), Max:98.1 °F (36.7 °C)      Intake/output-last 8 hours:    0701 -  1900  In: 1500 [I.V.:1500]  Out: 1120 [Urine:920]    Intake/output- 24 hours:       LAB:   Recent Results (from the past 24 hour(s))   TYPE & SCREEN    Collection Time: 17  6:50 AM   Result Value Ref Range    Crossmatch Expiration 2017     ABO/Rh(D) A POSITIVE     Antibody screen NEG    GLUCOSE, POC    Collection Time: 17  6:54 AM   Result Value Ref Range    Glucose (POC) 92 65 - 100 mg/dL    Performed by Ardelia Holter       Lab Results   Component Value Date/Time    INR 1.1 2017 08:51 AM     Lab Results   Component Value Date/Time    HGB 14.7 2017 08:51 AM    HGB 14.5 2017 05:15 PM    HGB 14.3 10/12/2016 09:15 PM     No results for input(s): NA, K, CL, BUN, CREA, GLU, CA, MG, PHOS, ALB, TBIL, TBILI, SGOT in the last 72 hours. No lab exists for component: ALT;3    PT/OT:   Gait:                    PATIENT MOBILITY                         Assessment and Plan    Principal Problem:    Primary localized osteoarthritis of right knee (2017)          POD#0 Procedure(s):  RIGHT TOTAL KNEE REPLACMENT AND REMOVAL OF HARDWEAR  Stable postop. VTE prophylaxis: ASA, Mobilization, Ankle pumping exercises, SCDs per order if not able to exercise or mobilize well. Weight bearing:  WBAT  Pain management:  Multi-modal pain plan, see above for medication,  Ice packs & elevation of extremity per orders, active gentle ROM & mobilize frequently for short periods of time. PT start this evening. Wound benign. Neurovascularly intact. Progressing well. No indications of concerns. Continue present plans per orthopedic attending surgeon & interdisciplinary team for joint replacement.         Signed By: Ashleigh Enriquez, NP    RN, MSN, MA, Adult NP-BC

## 2017-08-22 NOTE — ANESTHESIA PREPROCEDURE EVALUATION
Anesthetic History     PONV          Review of Systems / Medical History      Pulmonary                   Neuro/Psych         Psychiatric history     Cardiovascular                       GI/Hepatic/Renal                Endo/Other        Arthritis     Other Findings              Physical Exam    Airway  Mallampati: II  TM Distance: > 6 cm  Neck ROM: normal range of motion   Mouth opening: Normal     Cardiovascular  Regular rate and rhythm,  S1 and S2 normal,  no murmur, click, rub, or gallop             Dental  No notable dental hx       Pulmonary  Breath sounds clear to auscultation               Abdominal  GI exam deferred       Other Findings            Anesthetic Plan    ASA: 2  Anesthesia type: spinal      Post-op pain plan if not by surgeon: peripheral nerve block single    Induction: Intravenous  Anesthetic plan and risks discussed with: Patient

## 2017-08-22 NOTE — PROGRESS NOTES
TRANSFER - IN REPORT:    Verbal report received from Sofia(name) on Eligio Wong III, MD  being received from [employer] [facility] for routine post - op      Report consisted of patients Situation, Background, Assessment and   Recommendations(SBAR). Information from the following report(s) SBAR, OR Summary, Procedure Summary, Intake/Output, MAR and Recent Results was reviewed with the receiving nurse. Opportunity for questions and clarification was provided. Assessment completed upon patients arrival to unit and care assumed.

## 2017-08-22 NOTE — PROGRESS NOTES
Problem: Mobility Impaired (Adult and Pediatric)  Goal: *Acute Goals and Plan of Care (Insert Text)  Physical Therapy Goals  Initiated 8/22/2017    1. Patient will move from supine to sit and sit to supine , scoot up and down and roll side to side in bed with modified independence within 4 days. 2. Patient will perform sit to stand with modified independence within 4 days. 3. Patient will ambulate with modified independence for 300 feet with the least restrictive device within 4 days. 4. Patient will perform home exercise program per protocol with independence within 4 days. 5. Patient will demonstrate AROM 0-90 degrees in operative joint within 4 days. 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 Patient cleared by nursing for mobility and agreeable to participate in POD #0 mobility. Patient vital signs within normal limits. Patient able to perform bed mobility and achieve EOB sitting with supervision. Patient performed sit<>stand tx with CGA and demonstrates even WB bilaterally and good immediate standing balance. Patient able to ambulate with CGA and RW x 20 feet, although impulsive throughout this and attempted to take his hands off RW at times to test his balance. Educated on importance of using RW and maintaining safety. Patient returned to sitting and supine position in bed. Patient reportedly attended pre operative joint replacement education class. Physical therapist reviewed bed exercises and acute care expectations with patient.  Patient able to correctly perform ankle pumps, heel slides, and quad sets and has no additional questions. Patient has equipment needed and has walker present in hospital today. Will continue to follow to progress towards acute care goals. Recommend HHPT at d/c to continue to optimize independence with mobility. Patient will benefit from skilled intervention to address the above impairments. Patients rehabilitation potential is considered to be Good  Factors which may influence rehabilitation potential include:   [X]         None noted  [ ]         Mental ability/status  [ ]         Medical condition  [ ]         Home/family situation and support systems  [ ]         Safety awareness  [ ]         Pain tolerance/management  [ ]         Other:        PLAN :  Recommendations and Planned Interventions:  [X]           Bed Mobility Training             [X]    Neuromuscular Re-Education  [X]           Transfer Training                   [ ]    Orthotic/Prosthetic Training  [X]           Gait Training                         [ ]    Modalities  [X]           Therapeutic Exercises           [X]    Edema Management/Control  [X]           Therapeutic Activities            [X]    Patient and Family Training/Education  [ ]           Other (comment):     Frequency/Duration: Patient will be followed by physical therapy twice daily to address goals. Discharge Recommendations: Home Health  Further Equipment Recommendations for Discharge: none       SUBJECTIVE:   Patient stated I don't really think I need it that much.  referring to RW      OBJECTIVE DATA SUMMARY:   HISTORY:    Past Medical History:   Diagnosis Date    Arthritis      BPH (benign prostatic hyperplasia)      Cancer ([facility] [address] 1998     SCC UPPER LIP    Dementia      Hypercholesteremia      Hyperlipemia      Ill-defined condition       HX ELEVATED BILIRUBIN LEVEL - CONGENITAL    Kidney stone 1980'S     HX KIDNEY STONE    Nausea & vomiting      Psychiatric disorder       Depression Past Surgical History:   Procedure Laterality Date    HX APPENDECTOMY        HX COLONOSCOPY        HX GI   CHILDHOOD     PARTIAL COLECTOMY    HX HEENT   CHILDHOOD     T&A    HX HEENT   CHILDHOOD     REPAIR NASAL FX    HX ORTHOPAEDIC         Bilateral knee surgery, ARTHROSCOPIC AND OPEN    NEUROLOGICAL PROCEDURE UNLISTED         Skull fracture     Prior Level of Function/Home Situation: 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 Personal factors and/or comorbidities impacting plan of care: PMH, slight impulsivity     Home Situation  Home Environment: Private residence  # Steps to Enter: 0  One/Two Story Residence: Two story  # of Interior Steps: 0 (elevator)  Lift Chair Available: No  Living Alone: No  Support Systems: Spouse/Significant Other/Partner, Child(derek)  Patient Expects to be Discharged to[de-identified] Private residence  Current DME Used/Available at Home: ann marie Prieto, Walker, rolling     EXAMINATION/PRESENTATION/DECISION MAKING:   Hearing: Auditory  Auditory Impairment: None  Range Of Motion:  AROM: Within functional limits  Strength:    Strength: Within functional limits  Tone & Sensation:   Tone: Normal  Sensation: Intact  Coordination:  Coordination: Within functional limits  Functional Mobility:  Bed Mobility:  Supine to Sit: Supervision  Sit to Supine: Supervision  Scooting: Supervision  Transfers:  Sit to Stand: Contact guard assistance (bed slightly elevated due to patient's height)  Stand to Sit: Contact guard assistance  Balance:   Sitting: Intact  Standing: Intact; With support  Ambulation/Gait Training:  Distance (ft): 20 Feet (ft)  Assistive Device: Walker, rolling;Gait belt  Ambulation - Level of Assistance: Contact guard assistance  Gait Abnormalities: Antalgic;Decreased step clearance  Right Side Weight Bearing: As tolerated  Left Side Weight Bearing: Full  Base of Support: Widened  Stance: Right decreased; Left increased  Speed/[place]: Shadi Wagner decreased (<100 feet/min)  Step Length: Right shortened;Left shortened  Swing Pattern: Right asymmetrical     Functional Measure:  Tinetti test:      Sitting Balance: 1  Arises: 1  Attempts to Rise: 1  Immediate Standing Balance: 1  Standing Balance: 1  Nudged: 1  Eyes Closed: 0  Turn 360 Degrees - Continuous/Discontinuous: 0  Turn 360 Degrees - Steady/Unsteady: 0  Sitting Down: 1  Balance Score: 7  Indication of Gait: 1  R Step Length/Height: 1  L Step Length/Height: 1  R Foot Clearance: 1  L Foot Clearance: 1  Step Symmetry: 1  Step Continuity: 1  Path: 1  Trunk: 0  Walking Time: 0  Gait Score: 8  Total Score: 15         Tinetti Test and G-code impairment scale:  Percentage of Impairment CH     0%    CI     1-19% CJ     20-39% CK     40-59% CL     60-79% CM     80-99% CN      100%   Tinetti  Score 0-28 28 23-27 17-22 12-16 6-11 1-5 0          Tinetti Tool Score Risk of Falls  <19 = High Fall Risk  19-24 = Moderate Fall Risk  25-28 = Low Fall Risk  Tinetti ME. Performance-Oriented Assessment of Mobility Problems in Elderly Patients. Hogan 66; [id number]. (Scoring Description: PT Bulletin Feb. 10, 1993)     Older adults: Haleigh Anna et al, 2009; n = 1000 [facility] elderly evaluated with ABC, ISI, ADL, and IADL)  · Mean ISI score for males aged 69-68 years = 26.21(3.40)  · Mean ISI score for females age 69-68 years = 25.16(4.30)  · Mean ISI score for males over 80 years = 23.29(6.02)  · Mean ISI score for females over 80 years = 17.20(8.32)            G codes: In compliance with CMSs Claims Based Outcome Reporting, the following G-code set was chosen for this patient based on their primary functional limitation being treated: The outcome measure chosen to determine the severity of the functional limitation was the Tinetti with a score of 15/28 which was correlated with the impairment scale.       · Mobility - Walking and Moving Around:               - CURRENT STATUS:    CK - 40%-59% impaired, limited or restricted               - GOAL STATUS:           CJ - 20%-39% impaired, limited or restricted               - D/C STATUS:                       ---------------To be determined---------------         Physical Therapy Evaluation Charge Determination   History Examination Presentation Decision-Making   HIGH Complexity :3+ comorbidities / personal factors will impact the outcome/ POC  MEDIUM Complexity : 3 Standardized tests and measures addressing body structure, function, activity limitation and / or participation in recreation  LOW Complexity : Stable, uncomplicated  Other outcome measures Tinetti 15/28  MEDIUM      Based on the above components, the patient evaluation is determined to be of the following complexity level: LOW      Pain:  Pain Scale 1: Numeric (0 - 10)  Pain Intensity 1: 7  Pain Location 1: Knee  Pain Orientation 1: Posterior  Pain Description 1: Aching  Pain Intervention(s) 1: Distraction  Activity Tolerance:   Good  Please refer to the flowsheet for vital signs taken during this treatment. After treatment:   [ ]         Patient left in no apparent distress sitting up in chair  [X]         Patient left in no apparent distress in bed  [X]         Call bell left within reach  [X]         Nursing notified  [X]         Caregiver present  [ ]         Bed alarm activated      COMMUNICATION/EDUCATION:   The patients plan of care was discussed with: Registered Nurse.  [X]         Fall prevention education was provided and the patient/caregiver indicated understanding. [X]         Patient/family have participated as able in goal setting and plan of care. [X]         Patient/family agree to work toward stated goals and plan of care. [ ]         Patient understands intent and goals of therapy, but is neutral about his/her participation. [ ]         Patient is unable to participate in goal setting and plan of care.      Thank you for this referral.  Cristobal Quiroz, PT, DPT   Time Calculation: 19 mins

## 2017-08-22 NOTE — ROUTINE PROCESS
Patient: Lida Murguia MD MRN: [id number]  SSN: [id number]   YOB: 1945  Age: 71 y.o. Sex: male     Patient is status post Procedure(s):  RIGHT TOTAL KNEE REPLACMENT AND REMOVAL OF HARDWEAR. Surgeon(s) and Role:     * Mayra Mendez MD - Primary    Local/Dose/Irrigation:  122ML bupivacaine-EPINEPHrine (PF) 0.5 %-1:200,000 30 mL, ketorolac 30 mg, morphine 10 mg, bupivacaine liposome (PF) susp 266 mg in 0.9% sodium chloride 70 mL solution was injected into Right Knee at 0957. Peripheral IV 08/22/17 Left Arm (Active)   Dressing Status Clean, dry, & intact; New 8/22/2017  7:00 AM   Dressing Type Tape;Transparent 8/22/2017  7:00 AM   Hub Color/Line Status Green; Infusing 8/22/2017  7:00 AM                           Dressing/Packing:  Wound Knee Right-DRESSING TYPE: Topical skin adhesive/glue;Silver products; Cast padding (ACE) (08/22/17 0954)  Splint/Cast:  ]    Other:  Lucila Trevino

## 2017-08-22 NOTE — IP AVS SNAPSHOT
[address] 
[phone] Patient: Jose Riggs MD 
MRN: [id number] IPO:8/7/1560 Current Discharge Medication List  
  
START taking these medications Dose & Instructions Dispensing Information Comments Morning Noon Evening Bedtime  
 acetaminophen 500 mg tablet Commonly known as:  TYLENOL Your last dose was: Your next dose is:    
   
   
 Dose:  500 mg Take 1 Tab by mouth every four (4) hours (while awake). Quantity:  100 Tab Refills:  0  
     
   
   
   
  
 aspirin delayed-release 325 mg tablet Your last dose was: Your next dose is:    
   
   
 Dose:  325 mg Take 1 Tab by mouth two (2) times a day. For blood clot prevention. Quantity:  60 Tab Refills:  0  
     
   
   
   
  
 oxyCODONE IR 5 mg immediate release tablet Commonly known as:  Suresh Angela Your last dose was: Your next dose is:    
   
   
 Dose:  5-10 mg Take 1-2 Tabs by mouth every four (4) hours as needed. Max Daily Amount: 60 mg. Indications: Pain Quantity:  80 Tab Refills:  0  
     
   
   
   
  
 senna-docusate 8.6-50 mg per tablet Commonly known as:  Gwen Cambridge Your last dose was: Your next dose is:    
   
   
 Dose:  1 Tab Take 1 Tab by mouth two (2) times a day. Indications: constipation Quantity:  60 Tab Refills:  0 CONTINUE these medications which have NOT CHANGED Dose & Instructions Dispensing Information Comments Morning Noon Evening Bedtime  
 atorvastatin 10 mg tablet Commonly known as:  LIPITOR Your last dose was: Your next dose is:    
   
   
 Dose:  10 mg Take 10 mg by mouth daily. Refills:  0  
     
   
   
   
  
 buPROPion  mg SR tablet Commonly known as:  Lily Garcia Your last dose was:     
   
Your next dose is:    
   
   
 Dose:  150 mg  
 Take 150 mg by mouth daily. Refills:  0  
     
   
   
   
  
 CALCIUM 600 + D 600-125 mg-unit Tab Generic drug:  calcium-cholecalciferol (d3) Your last dose was: Your next dose is:    
   
   
 Dose:  1 Tab Take 1 Tab by mouth daily. Refills:  0  
     
   
   
   
  
 donepezil 5 mg tablet Commonly known as:  ARICEPT Your last dose was: Your next dose is:    
   
   
 Dose:  5 mg Take 5 mg by mouth daily. Refills:  0  
     
   
   
   
  
 escitalopram oxalate 20 mg tablet Commonly known as:  Octaviano Zia Your last dose was: Your next dose is:    
   
   
 Dose:  20 mg Take 20 mg by mouth daily. Refills:  0  
     
   
   
   
  
 folic acid 884 mcg tablet Your last dose was: Your next dose is:    
   
   
 Dose:  400 mcg Take 400 mcg by mouth daily. Refills:  0  
     
   
   
   
  
 multivitamin tablet Commonly known as:  ONE A DAY Your last dose was: Your next dose is:    
   
   
 Dose:  1 Tab Take 1 Tab by mouth daily. Refills:  0  
     
   
   
   
  
 terazosin 10 mg capsule Commonly known as:  HYTRIN Your last dose was: Your next dose is:    
   
   
 Dose:  10 mg Take 10 mg by mouth nightly. Refills:  0  
     
   
   
   
  
 VAYACOG PO Your last dose was: Your next dose is:    
   
   
 Dose:  1 Tab Take 1 Tab by mouth daily. Refills:  0  
     
   
   
   
  
 VITAMIN D3 2,000 unit Tab Generic drug:  cholecalciferol (vitamin D3) Your last dose was: Your next dose is:    
   
   
 Dose:  1 Tab Take 1 Tab by mouth daily. Refills:  0 STOP taking these medications ALEVE 220 mg tablet Generic drug:  naproxen sodium Where to Get Your Medications Information on where to get these meds will be given to you by the nurse or doctor. ! Ask your nurse or doctor about these medications acetaminophen 500 mg tablet  
 aspirin delayed-release 325 mg tablet  
 oxyCODONE IR 5 mg immediate release tablet  
 senna-docusate 8.6-50 mg per tablet

## 2017-08-22 NOTE — BRIEF OP NOTE
BRIEF OPERATIVE NOTE    Date of Procedure: 8/22/2017   Preoperative Diagnosis: DEGENERATIVE JOINT DISEASE  Postoperative Diagnosis: DEGENERATIVE JOINT DISEASE    Procedure(s):  RIGHT TOTAL KNEE REPLACMENT AND REMOVAL OF HARDWARE  Surgeon(s) and Role:     * Wanda Tidwell MD - Primary         Assistant Staff:       Surgical Staff:  Circ-1: Marianna Epps RN  Circ-2: Alex Cosme RN  Scrub RN-1: Josseline Delgadillo RN  Retractor Davila: Danniel Sever  Surg Asst-1: Rajinder Murillo  Event Time In   Incision Start 0781   Incision Close      Anesthesia: Spinal   Estimated Blood Loss: 100  Specimens: * No specimens in log *   Findings: severe djd   Complications: none  Implants:   Implant Name Type Inv.  Item Serial No.  Lot No. LRB No. Used Action   [employer]    N/A  N/A Right 2 Explanted   PAT SOLITARIO DOME MEDIAL 41MM -- ATTUNE - SNA  PAT SOLITARIO DOME MEDIAL 41MM -- ATTUNE NA [facility] [employer] [id number] Right 1 Implanted   FEM PS SZ 9 RT SOLITARIO -- ATTUNE - SNA  FEM PS SZ 9 RT SOLITARIO -- ATTUNE NA [facility] [employer] [id number] Right 1 Implanted   BASE TIB FB SZ 8 SOLITARIO -- ATTUNE - SNA  BASE TIB FB SZ 8 SOLITARIO -- ATTUNE NA [facility] [employer] [id number] Right 1 Implanted   CEMENT BNE GENTAMC MV 40GM -- SMARTSET ENDURANCE - SNA  CEMENT BNE GENTAMC MV 40GM -- SMARTSET ENDURANCE NA [facility] [employer] [id number] Right 2 Implanted   attune tibial insert fixed bearing posterior stabilized seze 9 8 mm AOX     NA DEPU[employer] ORTHO [id number] Right 1 Implanted

## 2017-08-23 LAB
ANION GAP SERPL CALC-SCNC: 6 MMOL/L (ref 5–15)
BUN SERPL-MCNC: 18 MG/DL (ref 6–20)
BUN/CREAT SERPL: 14 (ref 12–20)
CALCIUM SERPL-MCNC: 8.5 MG/DL (ref 8.5–10.1)
CHLORIDE SERPL-SCNC: 109 MMOL/L (ref 97–108)
CO2 SERPL-SCNC: 26 MMOL/L (ref 21–32)
CREAT SERPL-MCNC: 1.26 MG/DL (ref 0.7–1.3)
GLUCOSE SERPL-MCNC: 142 MG/DL (ref 65–100)
HGB BLD-MCNC: 11.8 G/DL (ref 12.1–17)
POTASSIUM SERPL-SCNC: 4.5 MMOL/L (ref 3.5–5.1)
SODIUM SERPL-SCNC: 141 MMOL/L (ref 136–145)

## 2017-08-23 PROCEDURE — 97116 GAIT TRAINING THERAPY: CPT

## 2017-08-23 PROCEDURE — 74011250636 HC RX REV CODE- 250/636: Performed by: ORTHOPAEDIC SURGERY

## 2017-08-23 PROCEDURE — 74011250636 HC RX REV CODE- 250/636: Performed by: NURSE PRACTITIONER

## 2017-08-23 PROCEDURE — 36415 COLL VENOUS BLD VENIPUNCTURE: CPT | Performed by: ORTHOPAEDIC SURGERY

## 2017-08-23 PROCEDURE — 74011250637 HC RX REV CODE- 250/637: Performed by: ORTHOPAEDIC SURGERY

## 2017-08-23 PROCEDURE — 74011250637 HC RX REV CODE- 250/637: Performed by: NURSE PRACTITIONER

## 2017-08-23 PROCEDURE — 65270000029 HC RM PRIVATE

## 2017-08-23 PROCEDURE — 85018 HEMOGLOBIN: CPT | Performed by: ORTHOPAEDIC SURGERY

## 2017-08-23 PROCEDURE — 80048 BASIC METABOLIC PNL TOTAL CA: CPT | Performed by: ORTHOPAEDIC SURGERY

## 2017-08-23 RX ORDER — OXYCODONE HYDROCHLORIDE 5 MG/1
2.5-5 TABLET ORAL
Qty: 75 TAB | Refills: 0 | Status: SHIPPED | OUTPATIENT
Start: 2017-08-23 | End: 2017-08-24

## 2017-08-23 RX ORDER — DOXAZOSIN 2 MG/1
10 TABLET ORAL
Status: DISCONTINUED | OUTPATIENT
Start: 2017-08-23 | End: 2017-08-24 | Stop reason: HOSPADM

## 2017-08-23 RX ORDER — ACETAMINOPHEN 500 MG
500 TABLET ORAL
Qty: 100 TAB | Refills: 0 | Status: SHIPPED
Start: 2017-08-23

## 2017-08-23 RX ORDER — ASPIRIN 325 MG
325 TABLET, DELAYED RELEASE (ENTERIC COATED) ORAL 2 TIMES DAILY
Qty: 60 TAB | Refills: 0 | Status: SHIPPED | OUTPATIENT
Start: 2017-08-23

## 2017-08-23 RX ORDER — AMOXICILLIN 250 MG
1 CAPSULE ORAL 2 TIMES DAILY
Qty: 60 TAB | Refills: 0 | Status: SHIPPED
Start: 2017-08-23

## 2017-08-23 RX ADMIN — OXYCODONE HYDROCHLORIDE 5 MG: 5 TABLET ORAL at 09:43

## 2017-08-23 RX ADMIN — Medication 10 ML: at 13:58

## 2017-08-23 RX ADMIN — DONEPEZIL HYDROCHLORIDE 5 MG: 5 TABLET, FILM COATED ORAL at 08:23

## 2017-08-23 RX ADMIN — FOLIC ACID TAB 400 MCG 0.4 MG: 400 TAB at 08:23

## 2017-08-23 RX ADMIN — POLYETHYLENE GLYCOL 3350 17 G: 17 POWDER, FOR SOLUTION ORAL at 08:22

## 2017-08-23 RX ADMIN — ACETAMINOPHEN 500 MG: 500 TABLET, FILM COATED ORAL at 06:17

## 2017-08-23 RX ADMIN — ATORVASTATIN CALCIUM 10 MG: 10 TABLET, FILM COATED ORAL at 08:23

## 2017-08-23 RX ADMIN — ACETAMINOPHEN 500 MG: 500 TABLET, FILM COATED ORAL at 13:58

## 2017-08-23 RX ADMIN — BUPROPION HYDROCHLORIDE 150 MG: 150 TABLET, EXTENDED RELEASE ORAL at 08:23

## 2017-08-23 RX ADMIN — DOCUSATE SODIUM AND SENNOSIDES 1 TABLET: 8.6; 5 TABLET, FILM COATED ORAL at 17:34

## 2017-08-23 RX ADMIN — OXYCODONE HYDROCHLORIDE 5 MG: 5 TABLET ORAL at 19:01

## 2017-08-23 RX ADMIN — Medication 5 ML: at 22:00

## 2017-08-23 RX ADMIN — OXYCODONE HYDROCHLORIDE 5 MG: 5 TABLET ORAL at 06:26

## 2017-08-23 RX ADMIN — ASPIRIN 325 MG: 325 TABLET, DELAYED RELEASE ORAL at 08:22

## 2017-08-23 RX ADMIN — DEXAMETHASONE SODIUM PHOSPHATE 10 MG: 4 INJECTION INTRA-ARTICULAR; INTRALESIONAL; INTRAMUSCULAR; INTRAVENOUS; SOFT TISSUE at 07:57

## 2017-08-23 RX ADMIN — ACETAMINOPHEN 500 MG: 500 TABLET, FILM COATED ORAL at 17:34

## 2017-08-23 RX ADMIN — ESCITALOPRAM OXALATE 20 MG: 10 TABLET ORAL at 08:23

## 2017-08-23 RX ADMIN — OXYCODONE HYDROCHLORIDE 5 MG: 5 TABLET ORAL at 12:54

## 2017-08-23 RX ADMIN — OXYCODONE HYDROCHLORIDE 5 MG: 5 TABLET ORAL at 15:55

## 2017-08-23 RX ADMIN — OXYCODONE HYDROCHLORIDE 5 MG: 5 TABLET ORAL at 03:24

## 2017-08-23 RX ADMIN — ASPIRIN 325 MG: 325 TABLET, DELAYED RELEASE ORAL at 17:33

## 2017-08-23 RX ADMIN — OXYCODONE HYDROCHLORIDE 5 MG: 5 TABLET ORAL at 21:35

## 2017-08-23 RX ADMIN — ACETAMINOPHEN 500 MG: 500 TABLET, FILM COATED ORAL at 21:35

## 2017-08-23 RX ADMIN — DOCUSATE SODIUM AND SENNOSIDES 1 TABLET: 8.6; 5 TABLET, FILM COATED ORAL at 08:22

## 2017-08-23 RX ADMIN — DOXAZOSIN 10 MG: 2 TABLET ORAL at 22:02

## 2017-08-23 RX ADMIN — HYDROMORPHONE HYDROCHLORIDE 0.5 MG: 1 INJECTION, SOLUTION INTRAMUSCULAR; INTRAVENOUS; SUBCUTANEOUS at 01:10

## 2017-08-23 RX ADMIN — SODIUM CHLORIDE 125 ML/HR: 900 INJECTION, SOLUTION INTRAVENOUS at 04:15

## 2017-08-23 RX ADMIN — ACETAMINOPHEN 500 MG: 500 TABLET, FILM COATED ORAL at 09:43

## 2017-08-23 NOTE — PROGRESS NOTES
Mild pain. No cp/sob.     Visit Vitals    /60 (BP 1 Location: Right arm, BP Patient Position: At rest)    Pulse 62    Temp 98.6 °F (37 °C)    Resp 16    Ht 6' 1\" (1.854 m)    Wt 86.2 kg (190 lb)    SpO2 98%    BMI 25.07 kg/m2       abd soft NT  R knee dressing dry  Calves soft NT  Motor 5/5  Pulses symmetrical  Recent Results (from the past 12 hour(s))   METABOLIC PANEL, BASIC    Collection Time: 08/23/17  3:29 AM   Result Value Ref Range    Sodium 141 136 - 145 mmol/L    Potassium 4.5 3.5 - 5.1 mmol/L    Chloride 109 (H) 97 - 108 mmol/L    CO2 26 21 - 32 mmol/L    Anion gap 6 5 - 15 mmol/L    Glucose 142 (H) 65 - 100 mg/dL    BUN 18 6 - 20 MG/DL    Creatinine 1.26 0.70 - 1.30 MG/DL    BUN/Creatinine ratio 14 12 - 20      GFR est AA >60 >60 ml/min/1.73m2    GFR est non-AA 56 (L) >60 ml/min/1.73m2    Calcium 8.5 8.5 - 10.1 MG/DL   HEMOGLOBIN    Collection Time: 08/23/17  3:29 AM   Result Value Ref Range    HGB 11.8 (L) 12.1 - 17.0 g/dL       POD 1 R TKA; acute postop blood loss anemia (expected)  -PT  -pain control  -ASA  -home today vs tomorrow    Jamil Byrd MD

## 2017-08-23 NOTE — PROGRESS NOTES
Bedside and Verbal shift change report given to conor (oncoming nurse) by Rolly Oliveros (offgoing nurse). Report included the following information SBAR.

## 2017-08-23 NOTE — ANESTHESIA POSTPROCEDURE EVALUATION
Post-Anesthesia Evaluation and Assessment    Patient: Erin Azar MD MRN: [id number]  SSN: [id number]    YOB: 1945  Age: 71 y.o. Sex: male       Cardiovascular Function/Vital Signs  Visit Vitals    /53 (BP 1 Location: Right arm, BP Patient Position: At rest)    Pulse 66    Temp 36.8 °C (98.2 °F)    Resp 16    Ht 6' 1\" (1.854 m)    Wt 86.2 kg (190 lb)    SpO2 98%    BMI 25.07 kg/m2       Patient is status post spinal anesthesia for Procedure(s):  RIGHT TOTAL KNEE REPLACMENT AND REMOVAL OF HARDWEAR. Nausea/Vomiting: None    Postoperative hydration reviewed and adequate. Pain:  Pain Scale 1: Numeric (0 - 10) (08/23/17 0942)  Pain Intensity 1: 9 (08/23/17 0942)   Managed    Neurological Status:   Neuro (WDL): Within Defined Limits (08/22/17 1110)  Neuro  LUE Motor Response: Purposeful (08/22/17 2104)  LLE Motor Response: Purposeful (08/22/17 2104)  RUE Motor Response: Purposeful (08/22/17 2104)  RLE Motor Response: Purposeful (08/22/17 2104)   At baseline    Mental Status and Level of Consciousness: Arousable    Pulmonary Status:   O2 Device: Room air (08/23/17 1058)   Adequate oxygenation and airway patent    Complications related to anesthesia: None    Post-anesthesia assessment completed.  No concerns    Signed By: Abdi Toribio MD     August 23, 2017

## 2017-08-23 NOTE — DISCHARGE INSTRUCTIONS
Patient meets criteria for   BUNDLED PAYMENT   for Care Improvement Initiative Criteria    Contact Information for Orthopedic Nurse Navigator:      ZAIDA Saunders, RN-BC  F:[phone]  U:[phone]  B:[phone]    After Hospital Care Plan:  Discharge Instructions Knee Replacement- Dr. Kelvin Salguero    Patient Name: Goldie Gar MD  Date of procedure: 8/22/2017   Procedure: Procedure(s):  RIGHT TOTAL KNEE REPLACMENT AND REMOVAL OF HARDWEAR  Surgeon: Surgeon(s) and Role:     * Jose Shaw MD - Primary  PCP: Cristhian Gonzales MD  Date of discharge: No discharge date for patient encounter. Follow up appointments   Follow up with Dr. Kelvin Salguero in 4 weeks. Call [phone] to make an appointment.  If home health has been arranged for you the agency will contact you to arrange dates/times for visits. Please call them if you do not hear from them within 24 hours after you are discharged    When to call your Orthopaedic Surgeon: Call [phone]. If you call after 5pm or on a weekend, the on call physician will be contacted   Unrelieved pain   Signs of infection-if your incision is red; continues to have drainage; drainage has a foul odor or if you have a persistent fever over 101 degrees   Signs of a blood clot in your leg-calf pain, tenderness, redness, swelling of lower leg    When to call your Primary Care Physician:   Concerns about medical conditions such as diabetes, high blood pressure, asthma, congestive heart failure   Call if blood sugars are elevated, persistent headache or dizziness, coughing or congestion, constipation or diarrhea, burning with urination, abnormal heart rate    When to call [id number] go to the nearest emergency room   Acute onset of chest pain, shortness of breath, difficulty breathing      Activity   Weight bearing as tolerated with walker or crutches.  Refer to pages 23-31 of your handbook for instructions and pictures   Complete your Home Exercise Program daily as instructed by your therapist.  Refer to pages 33-41 of your handbook for instructions and pictures   Get up every one hour and walk (except at night when sleeping)   Do not drive or operate heavy machinery      Incision Care   The Aquacel (brown, waterproof) surgical dressing is to remain on your knee for 7 days. On the 7th day have someone gently peel the dressing off by carefully lifting the edge and stretching it slightly to break the adhesive seal   If you have steri-strips (small, white pieces of tape) on your incision, they may come off when you remove the Aquacel surgical dressing. This is okay. You may now leave your incision open to air   If your Aquacel dressing comes loose/off before the 7th day, you may replace it with a dry sterile gauze dressing; change it daily. Once you incision is not draining, you may leave it open to air   You may take a shower with the Aquacel dressing in place. Once the Aquacel is removed, you may shower and get your incision wet but do not submerge your incision under water in a bath tub, hot tub or swimming pool for 6 weeks after surgery. Preventing blood clots    Take Enteric coated Aspirin (delayed release) one tablet twice a day for one month following surgery    Pain management   Take pain medication as prescribed; decrease the amount you use as your pain lessens   Avoid alcoholic beverages while taking pain medication   Please be aware that many medications contain Tylenol. We do not want you to over medicate so please read the information below as a guide. Do not take more than 3 Grams of Tylenol in a 24 hour period.   (There are 1000 milligrams in one Gram)   o Tylenol 325 mg per tablet (do not take more than 9 tablets in 24 hours)  o Tylenol 500 mg per tablet (do not take more than 6 tablets in 24 hours)  o Tylenol 650 mg per tablet (do not take more than 4 tablets in 24 hours)   Elevate your leg (do not bend/flex knee) and place an ice bags on your knee for 15-20 minutes after exercising    Diet   Resume usual diet; drink plenty of fluids; eat foods high in fiber   You may want to take a stool softener (such as Senokot-S or Colace) to prevent constipation while you are taking pain medication. If constipation occurs, take a laxative (such as Dulcolax tablets, Milk of Magnesia, or a suppository)      [facility] Protocol (to be followed by [address])  Nursing-per physicians order   Complete head to toe assessment, vital signs   Medication reconciliation   Review pain management   Manage chronic medical conditions    Physical Therapy-per physicians order  Weight bearing status:  Precautions at Admission: Fall, WBAT  Left Side Weight Bearing: Full  Right Side Weight Bearing: As tolerated    Mobility Status:  Supine to Sit: Supervision  Sit to Stand: Contact guard assistance (verbal cues to push from bed)  Sit to Supine: Supervision     Gait:  Distance (ft): 300 Feet (ft)  Ambulation - Level of Assistance: Contact guard assistance  Assistive Device: Walker, rolling, Gait belt  Gait Abnormalities: Decreased step clearance, Antalgic      Physical Therapy   Assessment and evaluation-bed mobility; functional transfers (bed, chair, bathroom, stairs); ambulation with equipment, car transfers, shower transfers, safety and ability to get out of house in the event of an emergency   Review weight bearing as tolerated, wean from walker or crutches as tolerated   Discuss pain management   Review how to do ADLs. Refer to page 42 of patient handbook    Home Exercise Program-refer to pages 33-41 of patient handbook for exercises.

## 2017-08-23 NOTE — OP NOTES
[address]   Josie [place], [address]   OP NOTE       Name:  Carlos Alberto KHAN   MR#:  [id number]   :  1945   Account #:  [de-identified]    Surgery Date:  2017   Date of Adm:  2017       SURGEON: Deanna Villegas MD    FIRST ASSISTANT: Gurmeet Blakely    PREOPERATIVE DIAGNOSIS: Osteoarthritis of the right knee. POSTOPERATIVE DIAGNOSIS: Osteoarthritis of the right knee. PROCEDURES PERFORMED   1. Right total knee arthroplasty. 2. Removal of deep hardware, right knee. COMPONENTS IMPLANTED: DePuy Attune size 9 posterior stabilized   femur, size 8 tibial tray with 8 mm posterior stabilized polyethylene   insert, and 41 mm patella. ANESTHESIA: Spinal with sedation as well as adductor canal block. COMPLICATIONS: None. ESTIMATED BLOOD LOSS: 100 mL. SPECIMENS REMOVED: None. INDICATIONS: The patient is a 81-year-old male with progressive right   knee pain due to severe tricompartmental arthritis. Symptoms have   progressed despite comprehensive conservative treatment measures   and he presents for right total knee replacement. Risks, benefits,   alternatives of the procedure were reviewed with him in detail and he   desires to proceed. PROCEDURE IN DETAIL: Anesthesia team placed an adductor canal   block before taking the patient to the operating room where they also   placed a spinal. Preoperative IV antibiotics were administered. Webb   catheter was inserted and padded pneumatic tourniquet was placed   around the right upper thigh. Right lower extremity was prepped and   draped in the usual sterile fashion. Tourniquet was inflated to 275. I utilized his existing midline anterior knee incision and extended   proximal and distal to perform a medial parapatellar arthrotomy. Progressive medial release was performed to facilitate exposure and   soft tissue balance throughout the procedure.  I took 10 mm off the   distal femur using a 5-degree distal femoral cutting block over a long   intramedullary eladio. The femur was sized to a 9 and prepared for a size   9 posterior stabilized component utilizing appropriate jigs. Rotational   alignment was gauged off of Whitesides line as well as the   transepicondylar axis. Neutral proximal tibial resection was performed   using an extramedullary alignment guide. Menisci were excised and   posterior osteophytes removed from the femur. Subperiosteal posterior   capsular release was performed. Progressive medial release was   performed until there were symmetrical and equal flexion, extension   gaps with an 8-mm spacer block. While preparing for the trial   tibia, I encountered the metaphyseal staples in the anteromedial tibia   from his prior ACL reconstruction and the staples were in the way of   the trial implant. I dissected a little more distal over the anteromedial   tibia to identify the staples which were removed with an osteotome and   pliers. Trials were inserted and with an 8-mm insert in place, the knee   had full extension to gravity, satisfactory coronal plane stability and soft   tissue tension throughout arc of motion. The patella was prepared for a   41 mm component and tracked centrally using no-thumbs technique. Trials were removed and bony surfaces were copiously irrigated by   pulse lavage and dried before the real components were cemented   into place using antibiotic-impregnated cement. Excess cement was   removed and the knee was reduced in full extension with the real insert   in place during cement setup. Periarticular soft tissues were injected   with a solution containing Exparel, as 0.5% Marcaine with epinephrine,   morphine and Toradol. The tourniquet was released and hemostasis   obtained with the Bovie. The wound was irrigated. Arthrotomy was   closed with a combination of heavy Vicryl sutures and a running #2   Stratafix suture.  Skin and subcutaneous layers were closed in layered   fashion with Vicryl and a running Monocryl subcuticular stitch. The   wound was dressed with Dermabond and an Aquacel occlusive   dressing as well as a sterile compressive dressing. The patient was   transported to the postanesthesia care unit in stable condition. All   counts were correct at the end of the procedure.         Mj Amaro MD      [facility]   D:  08/22/2017   22:30   T:  08/23/2017   00:13   Job #:  [id number]

## 2017-08-23 NOTE — PROGRESS NOTES
Problem: Mobility Impaired (Adult and Pediatric)  Goal: *Acute Goals and Plan of Care (Insert Text)  Physical Therapy Goals  Initiated 8/22/2017    1. Patient will move from supine to sit and sit to supine , scoot up and down and roll side to side in bed with modified independence within 4 days. 2. Patient will perform sit to stand with modified independence within 4 days. 3. Patient will ambulate with modified independence for 300 feet with the least restrictive device within 4 days. 4. Patient will perform home exercise program per protocol with independence within 4 days. 5. Patient will demonstrate AROM 0-90 degrees in operative joint within 4 days. 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 Cleared by RN for second session this afternoon. Pt demonstrated isometric supine exercises and heel slides. Pt with increased pain and hesitant to flex past 30 degrees in bed. PROM with same pain posterior to the knee. Transfers and gait training completed with CGA for safety. Pt improving weight bearing through R LE during gait cycle with less reliance on RW. Step through pattern demonstrated 75% of gait training. Completed 4 steps with CGA. Pt will benefit from one more PT session tomorrow morning to reinforce safety and review exercises, position changes, tranfers. HHPT to follow up returning home.    Progression toward goals:  [X]      Improving appropriately and progressing toward goals  [ ]      Improving slowly and progressing toward goals  [ ]      Not making progress toward goals and plan of care will be adjusted       PLAN:  Patient continues to benefit from skilled intervention to address the above impairments. Continue treatment per established plan of care. Discharge Recommendations:  Home Health  Further Equipment Recommendations for Discharge:  None needed       SUBJECTIVE:   The pain is in the popliteal fossa because of the swelling. OBJECTIVE DATA SUMMARY:   Range of Motion:           RLE AROM  R Knee Flexion: 70  R Knee Extension: 5              Functional Mobility Training:  Bed Mobility:     Supine to Sit: Supervision  Sit to Supine: Supervision           Transfers:  Sit to Stand: Contact guard assistance (verbal cues to push from bed)  Stand to Sit: Contact guard assistance                             Ambulation/Gait Training:  Distance (ft): 300 Feet (ft)  Assistive Device: Walker, rolling;Gait belt  Ambulation - Level of Assistance: Contact guard assistance        Gait Abnormalities: Decreased step clearance; Antalgic        Base of Support: Widened     Speed/[place]: Pace decreased (<100 feet/min)  Step Length: Left shortened  Swing Pattern: Right asymmetrical                          Stairs:  Number of Stairs Trained: 4  Stairs - Level of Assistance: Contact guard assistance  Rail Use: Both  Therapeutic Exercises:   Exercises performed per protocol. See morning treatment note for description. Activity Tolerance:   Good  Please refer to the flowsheet for vital signs taken during this treatment.   After treatment:   [ ] Patient left in no apparent distress sitting up in chair  [X] Patient left in no apparent distress in bed  [X] Call bell left within reach  [X] Nursing notified  [ ] Caregiver present  [ ] Bed alarm activated      COMMUNICATION/COLLABORATION:   The patients plan of care was discussed with: Registered Nurse     Shelly Sy, PT   Time Calculation: 18 mins

## 2017-08-23 NOTE — PROGRESS NOTES
CM reviewed chart. CM noted pt had right total knee replacement with history of arthritis, cancer, dementia, hypercholesteremia, hyperlipemia, kidney stone, and depression. CM met with pt to introduce self and role and offer support. Bedside assessment completed. CURRENT LIVING SITUAITION-  Pt states he lives with his wife in a private residence. Pt was driving prior to this hospital stay and has assistance with transportation as needed. Pt denies use of assistive devices and pt was independent with ADL's and IADL's. Pt's PCP is Dr Maggie Sherwood. Pt last saw his PCP within the past month. Pt gets his prescriptions filled at [facility]. CM verified with pt his insurance is Medicare. Pt does not have an AMD and declines information. DISCHARGE PLANNING-  Pt denies need for assistance with medications, transportation, and follow up appointments. Disposition plan is to discharge home in care of family, home health, and self. CM offered choice of HH and [facility]. [facility]erral sent via [employer] to [facility] and information added to AVS.  Karishma Carlson RN CRM      Care Management Interventions  PCP Verified by CM:  Yes (Dr Maggie Sherwood)  Palliative Care Consult (Criteria: CHF and RRAT>21): No  Mode of Transport at Discharge: Self  Transition of Care Consult (CM Consult): [address]: No  Reason Outside [place]: Physician referred to specific agency ([facility])  [address]: No  Physical Therapy Consult: Yes  Occupational Therapy Consult: No  Speech Therapy Consult: No  Current Support Network: Lives with Spouse, Own Home  Confirm Follow Up Transport: Family  Plan discussed with Pt/Family/Caregiver: Yes  Freedom of Choice Offered: Yes  Discharge Location  Discharge Placement: Home with home health

## 2017-08-23 NOTE — ROUTINE PROCESS
Bedside and Verbal shift change report given to Ramsey Garcia (oncoming nurse) by Beata Bowen (offgoing nurse). Report included the following information SBAR, Kardex, Intake/Output, MAR and Recent Results.

## 2017-08-23 NOTE — PROGRESS NOTES
Problem: Mobility Impaired (Adult and Pediatric)  Goal: *Acute Goals and Plan of Care (Insert Text)  Physical Therapy Goals  Initiated 8/22/2017    1. Patient will move from supine to sit and sit to supine , scoot up and down and roll side to side in bed with modified independence within 4 days. 2. Patient will perform sit to stand with modified independence within 4 days. 3. Patient will ambulate with modified independence for 300 feet with the least restrictive device within 4 days. 4. Patient will perform home exercise program per protocol with independence within 4 days. 5. Patient will demonstrate AROM 0-90 degrees in operative joint within 4 days. 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 CGA for bed mobility with piggy backing of operative leg to reach EOB. Increased pain when leg in dependent position dangling in the flexed position. Improved when supported with L LE. Pt stands with CGA and able to weight bear equally to pull up shorts without support of RW. Ambulated 100ft with good foot clearance and knee flexion. Performed standing exercises before returning to chair with stool to prop leg into extension. Educated on fall prevention and use of call bell when getting back to bed. Wife present. Recommending HHPT at discharge.   Progression toward goals:  [X]      Improving appropriately and progressing toward goals  [ ]      Improving slowly and progressing toward goals  [ ]      Not making progress toward goals and plan of care will be adjusted       PLAN:  Patient continues to benefit from skilled intervention to address the above impairments. Continue treatment per established plan of care. Discharge Recommendations:  Home Health  Further Equipment Recommendations for Discharge:  None-pt's walker present       SUBJECTIVE:   Patient stated I don't think I need this anymore-walker.       OBJECTIVE DATA SUMMARY:   Critical Behavior:              Range of Motion:           RLE AROM  R Knee Flexion: 70  R Knee Extension: 5              Functional Mobility Training:  Bed Mobility:     Supine to Sit: Contact guard assistance              Transfers:  Sit to Stand: Contact guard assistance  Stand to Sit: Contact guard assistance                             Balance:  Sitting: Intact  Standing: Intact; With support  Ambulation/Gait Training:  Distance (ft): 100 Feet (ft)  Assistive Device: Walker, rolling;Gait belt  Ambulation - Level of Assistance: Contact guard assistance        Gait Abnormalities: Decreased step clearance; Antalgic        Base of Support: Widened     Speed/[place]: Pace decreased (<100 feet/min)  Step Length: Right shortened;Left shortened                    Therapeutic Exercises:     EXERCISE   Sets   Reps   Active Active Assist   Passive Self ROM   Comments   Ankle Pumps 1 5 [X]                                        [ ]                                        [ ]                                        [ ]                                            Quad Sets 1 5 [X]                                        [ ]                                        [ ]                                        [ ]                                            Long Arc Quads 1 5 [X]                                        [ ]                                        [ ]                                        [ ]                                            Knee Flexion Stretch 1 3 [X]                                        [ ]                                        [ ]                                        [ ] Straight Leg Raises 1 10 [X]                                        [ ]                                        [ ]                                        [ ]                                               Activity Tolerance:   Good  Please refer to the flowsheet for vital signs taken during this treatment.   After treatment:   [X] Patient left in no apparent distress sitting up in chair  [ ] Patient left in no apparent distress in bed  [X] Call bell left within reach  [X] Nursing notified  [ ] Caregiver present  [ ] Bed alarm activated      COMMUNICATION/COLLABORATION:   The patients plan of care was discussed with: Registered Nurse     Acosta Arthur, PT   Time Calculation: 19 mins

## 2017-08-24 VITALS
BODY MASS INDEX: 25.18 KG/M2 | RESPIRATION RATE: 18 BRPM | SYSTOLIC BLOOD PRESSURE: 144 MMHG | DIASTOLIC BLOOD PRESSURE: 84 MMHG | TEMPERATURE: 99.2 F | HEIGHT: 73 IN | HEART RATE: 83 BPM | OXYGEN SATURATION: 94 % | WEIGHT: 190 LBS

## 2017-08-24 PROBLEM — F03.90 DEMENTIA (HCC): Chronic | Status: ACTIVE | Noted: 2017-08-22

## 2017-08-24 LAB — HGB BLD-MCNC: 12.2 G/DL (ref 12.1–17)

## 2017-08-24 PROCEDURE — 97116 GAIT TRAINING THERAPY: CPT

## 2017-08-24 PROCEDURE — 85018 HEMOGLOBIN: CPT | Performed by: ORTHOPAEDIC SURGERY

## 2017-08-24 PROCEDURE — 97530 THERAPEUTIC ACTIVITIES: CPT

## 2017-08-24 PROCEDURE — 74011250637 HC RX REV CODE- 250/637: Performed by: ORTHOPAEDIC SURGERY

## 2017-08-24 PROCEDURE — 36415 COLL VENOUS BLD VENIPUNCTURE: CPT | Performed by: ORTHOPAEDIC SURGERY

## 2017-08-24 RX ORDER — OXYCODONE HYDROCHLORIDE 5 MG/1
5-10 TABLET ORAL
Qty: 80 TAB | Refills: 0 | Status: SHIPPED | OUTPATIENT
Start: 2017-08-24

## 2017-08-24 RX ADMIN — POLYETHYLENE GLYCOL 3350 17 G: 17 POWDER, FOR SOLUTION ORAL at 09:28

## 2017-08-24 RX ADMIN — DOCUSATE SODIUM AND SENNOSIDES 1 TABLET: 8.6; 5 TABLET, FILM COATED ORAL at 09:28

## 2017-08-24 RX ADMIN — ACETAMINOPHEN 500 MG: 500 TABLET, FILM COATED ORAL at 09:28

## 2017-08-24 RX ADMIN — ACETAMINOPHEN 500 MG: 500 TABLET, FILM COATED ORAL at 07:06

## 2017-08-24 RX ADMIN — OXYCODONE HYDROCHLORIDE 5 MG: 5 TABLET ORAL at 07:06

## 2017-08-24 RX ADMIN — Medication 5 ML: at 06:00

## 2017-08-24 RX ADMIN — DONEPEZIL HYDROCHLORIDE 5 MG: 5 TABLET, FILM COATED ORAL at 09:28

## 2017-08-24 RX ADMIN — ASPIRIN 325 MG: 325 TABLET, DELAYED RELEASE ORAL at 09:28

## 2017-08-24 RX ADMIN — FOLIC ACID TAB 400 MCG 0.4 MG: 400 TAB at 09:28

## 2017-08-24 RX ADMIN — OXYCODONE HYDROCHLORIDE 5 MG: 5 TABLET ORAL at 10:41

## 2017-08-24 RX ADMIN — ESCITALOPRAM OXALATE 20 MG: 10 TABLET ORAL at 09:28

## 2017-08-24 RX ADMIN — BUPROPION HYDROCHLORIDE 150 MG: 150 TABLET, EXTENDED RELEASE ORAL at 09:28

## 2017-08-24 RX ADMIN — OXYCODONE HYDROCHLORIDE 5 MG: 5 TABLET ORAL at 00:46

## 2017-08-24 RX ADMIN — ATORVASTATIN CALCIUM 10 MG: 10 TABLET, FILM COATED ORAL at 09:28

## 2017-08-24 RX ADMIN — OXYCODONE HYDROCHLORIDE 5 MG: 5 TABLET ORAL at 03:46

## 2017-08-24 NOTE — PROGRESS NOTES
Problem: Mobility Impaired (Adult and Pediatric)  Goal: *Acute Goals and Plan of Care (Insert Text)  Physical Therapy Goals  Initiated 8/22/2017    1. Patient will move from supine to sit and sit to supine , scoot up and down and roll side to side in bed with modified independence within 4 days. 2. Patient will perform sit to stand with modified independence within 4 days. 3. Patient will ambulate with modified independence for 300 feet with the least restrictive device within 4 days. 4. Patient will perform home exercise program per protocol with independence within 4 days. 5. Patient will demonstrate AROM 0-90 degrees in operative joint within 4 days. 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 ASSESSMENT:  Pt received supine in bed, agreeable to PT. Pt Supervision-SBA w/bed mobility and transfers. Pt practiced sit>stand x3 from elevated bed, focusing on proper hand placement w/ transfer. Pt amb approx 200 FT (RW, SBA) and practiced 4 steps using both rails (CGA). Pt reported he has elevator in home (goes down to garage and up to second floor). Pt amb to bathroom prior to returning to bed. Once returned to bed, pt performed supine bed exercises. Also reviewed HEP, icing schedule and activity pacing once d/c home. Pt cleared from PT standpoint for d/c home w/ HHPT (RN aware).    Progression toward goals:  [X]      Improving appropriately and progressing toward goals  [ ]      Improving slowly and progressing toward goals  [ ]      Not making progress toward goals and plan of care will be adjusted       PLAN:  Patient continues to benefit from skilled intervention to address the above impairments. Continue treatment per established plan of care. Discharge Recommendations:  Home Health  Further Equipment Recommendations for Discharge:  None (Owns RW)       SUBJECTIVE:   Patient stated I don't really like sitting .       OBJECTIVE DATA SUMMARY:   Critical Behavior:                                         Functional Mobility Training:  Bed Mobility:     Supine to Sit: Supervision  Sit to Supine: Supervision  Scooting: Supervision                    Transfers:  Sit to Stand: Stand-by asssistance  Stand to Sit: Stand-by asssistance                             Balance:  Sitting: Intact  Standing: Intact; With support  Ambulation/Gait Training:  Distance (ft): 200 Feet (ft)  Assistive Device: Gait belt;Walker, rolling  Ambulation - Level of Assistance: Stand-by asssistance        Gait Abnormalities: Antalgic;Decreased step clearance  Right Side Weight Bearing: As tolerated     Base of Support: Narrowed     Speed/[place]: Pace decreased (<100 feet/min)  Step Length: Left shortened;Right shortened                 Stairs:  Number of Stairs Trained: 4  Stairs - Level of Assistance: Contact guard assistance  Rail Use: Both  Therapeutic Exercises:     EXERCISE   Sets   Reps   Active Active Assist   Passive Self ROM   Comments   Ankle Pumps     [ ]                                [ ]                                [ ]                                [ ]                                    Quad Sets   10 [ ]                                [ ]                                [ ]                                [ ]                                    Hamstring Sets     [ ]                                [ ]                                [ ]                                [ ]                                    Dorothe Severs     [ ]                                [ ]                                [ ]                                [ ]                                    Knee Extension Stretch       [ ]                                  [ ]                                  [ ]                                  [ ]                                    Heel Slides   10 [X]                                [ ]                                [ ]                                [ ]                                    Pink Due     [ ]                                [ ]                                [ ]                                [ ]                                    Ahsan Aurora     [ ]                                [ ]                                [ ]                                [ ]                                    Straight Leg Raises   10 [X]                                [ ]                                [ ]                                [ ]                                Using gait belt         Pain:  Pain Scale 1: Numeric (0 - 10)  Pain Intensity 1: 5  Pain Location 1: Knee  Pain Orientation 1: Right  Pain Description 1: Aching  Pain Intervention(s) 1: Medication (see MAR)  Activity Tolerance:   Good  Please refer to the flowsheet for vital signs taken during this treatment.   After treatment:   [X] Patient left in no apparent distress sitting up in chair  [ ] Patient left in no apparent distress in bed  [X] Call bell left within reach  [X] Nursing notified  [ ] Caregiver present  [ ] Bed alarm activated      COMMUNICATION/COLLABORATION:   The patients plan of care was discussed with: Registered Nurse Dax Luciano PTA   Time Calculation: 31 mins

## 2017-08-24 NOTE — PROGRESS NOTES
I have reviewed discharge instructions with the patient. The patient verbalized understanding. Reviewed prescriptions, signs and symptoms to report. Patient left via wheelchair with volunteer assistance and driven home by wife.

## 2017-08-24 NOTE — PROGRESS NOTES
[address]  AR Bundled Payment Handoff     FROM:                                TO: At [address]                                                      ([address] [place] [address] or Facility name)  [address]  [address] [address]  Dept: [address]: [phone]                                      Room#:  570/01                                                      Discharging Nurse:  Perez Walker RN  Unit WP#:[phone]         SITUATION      ASAScore: ASA 1 - Normal health patient    Admitted:  8/22/2017  Hospital Day: 3      Attending Provider:  No att. providers found     Consultations:  None    PCP:  Tom Herbert MD   [phone]     Admitting Dx:  DJD  Primary localized osteoarthritis of right knee       Principal Problem:    Primary localized osteoarthritis of right knee (8/22/2017)    Active Problems:    Dementia (8/22/2017)      2 Days Post-Op of   Procedure(s):  RIGHT TOTAL KNEE REPLACMENT AND REMOVAL OF HARDWEAR   BY: Robe Veras MD             ON: 8/22/2017                  Code Status: Full Code             Advance Directive? No Doesnt Have (Send w/patient)     Isolation:  There are currently no Active Isolations       MDRO: No current active infections    BACKGROUND     Allergies:  No Known Allergies    Past Medical History:   Diagnosis Date    Arthritis     BPH (benign prostatic hyperplasia)     Cancer ([facility] [address] 1998    SCC UPPER LIP    Dementia     Hypercholesteremia     Hyperlipemia     Ill-defined condition     HX ELEVATED BILIRUBIN LEVEL - CONGENITAL    Kidney stone 1980'S    HX KIDNEY STONE    Nausea & vomiting     Psychiatric disorder     Depression       Past Surgical History:   Procedure Laterality Date    HX APPENDECTOMY      HX COLONOSCOPY      HX GI  CHILDHOOD    PARTIAL COLECTOMY    HX HEENT  CHILDHOOD    T&A    HX HEENT  CHILDHOOD    REPAIR NASAL FX    HX ORTHOPAEDIC      Bilateral knee surgery, ARTHROSCOPIC AND OPEN    NEUROLOGICAL PROCEDURE UNLISTED      Skull fracture       Prior to Admission Medications   Prescriptions Last Dose Informant Patient Reported? Taking? PHOS. SERINE/OMEGA-3/DHA/EPA (VAYACOG PO)   Yes No   Sig: Take 1 Tab by mouth daily. atorvastatin (LIPITOR) 10 mg tablet 8/22/2017 at Unknown time  Yes Yes   Sig: Take 10 mg by mouth daily. buPROPion SR (WELLBUTRIN SR) 150 mg SR tablet 8/22/2017 at Unknown time  Yes Yes   Sig: Take 150 mg by mouth daily. calcium-cholecalciferol, d3, (CALCIUM 600 + D) 600-125 mg-unit tab 8/22/2017 at Unknown time  Yes Yes   Sig: Take 1 Tab by mouth daily. cholecalciferol, vitamin D3, (VITAMIN D3) 2,000 unit tab 8/21/2017 at Unknown time  Yes Yes   Sig: Take 1 Tab by mouth daily. donepezil (ARICEPT) 5 mg tablet 8/22/2017 at Unknown time  Yes Yes   Sig: Take 5 mg by mouth daily. escitalopram oxalate (LEXAPRO) 20 mg tablet 8/21/2017 at Unknown time  Yes Yes   Sig: Take 20 mg by mouth daily. folic acid 923 mcg tablet 8/21/2017 at Unknown time  Yes Yes   Sig: Take 400 mcg by mouth daily. multivitamin (ONE A DAY) tablet 8/21/2017 at Unknown time  Yes Yes   Sig: Take 1 Tab by mouth daily. terazosin (HYTRIN) 10 mg capsule 8/21/2017 at Unknown time  Yes Yes   Sig: Take 10 mg by mouth nightly. Facility-Administered Medications: None       Vaccinations: There is no immunization history on file for this patient. ASSESSMENT   Age: 71 y.o.              Gender: male        Height: Height: 6' 1\" (185.4 cm)                    Weight:Weight: 86.2 kg (190 lb)     Patient Vitals for the past 8 hrs:   Temp Pulse Resp BP   08/24/17 0832 99.2 °F (37.3 °C) 83 18 144/84            Active Orders   Diet    DIET REGULAR       Orientation:      Active Lines/Drains:  (Peg Tube / Webb / CL or S/L?):yes    Urinary Status: Voiding      Last BM: Last Bowel Movement Date: 08/22/17     Skin Integrity: Incision (comment)   Wound Knee Right-DRESSING STATUS: Clean, dry, and intact    Wound Knee Right-DRESSING TYPE: Elastic bandage, Silver products (Aquacel)    Mobility: Slightly limited   Weight Bearing Status: WBAT (Weight Bearing as Tolerated)      Gait Training  Assistive Device: Gait belt, Walker, rolling  Ambulation - Level of Assistance: Stand-by asssistance  Distance (ft): 200 Feet (ft)  Stairs - Level of Assistance: Contact guard assistance  Number of Stairs Trained: 4  Rail Use: Both     On Anticoagulation? YES  Aspirin  325mg                                    Last dose:  8/24/2017at 0930    Pain Medications given:  Oxycodone 5mg                                 Last dose: 8/24/2017 at  1041    Lab Results   Component Value Date/Time    Glucose 142 08/23/2017 03:29 AM    Hemoglobin A1c 5.4 08/07/2017 08:51 AM    INR 1.1 08/07/2017 08:51 AM    HGB 12.2 08/24/2017 04:00 AM    HGB 11.8 08/23/2017 03:29 AM    HGB 14.7 08/07/2017 08:51 AM    HGB 14.5 04/18/2017 05:15 PM       Readmission Risks:  Score:         RECOMMENDATION     See After Visit Summary (AVS) for:  · Discharge instructions  · After [address]   · Medication Reconciliation          [facility] Orthopaedic Nurse Navigator  ZAIDA Rapp, RN-BC       Office  [phone]  Cell      [phone]  Fax      [phone]  [email or web address]             . Frannie

## 2017-08-24 NOTE — PROGRESS NOTES
NP ORTHO PROGRESS NOTE  Post Op day: 2 Days Post-Op    August 24, 2017 10:22 AM     Luis Peace MD  [id number]  male  71 y.o.   1945    Admit date: 8/22/2017  Date of Surgery: 8/22/2017   Procedures: Procedure(s):  RIGHT TOTAL KNEE REPLACMENT AND REMOVAL OF [address]  Admitting Physician: Win Comer MD   Surgeon: Spero Spatz) and Role:     * Win Comer MD - Primary    Chart/Meds/Labs Reviewed  Current Facility-Administered Medications   Medication Dose Route Frequency    doxazosin (CARDURA) tablet 10 mg  10 mg Oral QHS    atorvastatin (LIPITOR) tablet 10 mg  10 mg Oral DAILY    buPROPion SR (WELLBUTRIN SR) tablet 150 mg  150 mg Oral DAILY    donepezil (ARICEPT) tablet 5 mg  5 mg Oral DAILY    escitalopram oxalate (LEXAPRO) tablet 20 mg  20 mg Oral DAILY    folic acid tablet 0.4 mg  400 mcg Oral DAILY    sodium chloride 0.9 % bolus infusion 500 mL  500 mL IntraVENous ONCE PRN    sodium chloride (NS) flush 5-10 mL  5-10 mL IntraVENous Q8H    sodium chloride (NS) flush 5-10 mL  5-10 mL IntraVENous PRN    acetaminophen (TYLENOL) tablet 500 mg  500 mg Oral Q4HWA    oxyCODONE IR (ROXICODONE) tablet 2.5 mg  2.5 mg Oral Q3H PRN    oxyCODONE IR (ROXICODONE) tablet 5 mg  5 mg Oral Q3H PRN    naloxone (NARCAN) injection 0.4 mg  0.4 mg IntraVENous PRN    hydrOXYzine HCl (ATARAX) tablet 10 mg  10 mg Oral Q8H PRN    senna-docusate (PERICOLACE) 8.6-50 mg per tablet 1 Tab  1 Tab Oral BID    polyethylene glycol (MIRALAX) packet 17 g  17 g Oral DAILY    bisacodyl (DULCOLAX) suppository 10 mg  10 mg Rectal DAILY PRN    aspirin delayed-release tablet 325 mg  325 mg Oral BID    acetaminophen (TYLENOL) tablet 325-650 mg  325-650 mg Oral BID PRN       Subjective:    Complaints: None  Denies Dizziness, CP, SOB, N/V, Abdominal pain, numbness or tingling of extremities. Able to perform ankle pumps easily. Progressing with mobility. Eating & drinking well. Has had normal BM yesterday.   Incisional pain control: well controlled  Pain Control:   Pain Assessment  Pain Scale 1: Numeric (0 - 10)  Pain Intensity 1: 4  Pain Onset 1: post op  Pain Location 1: Knee  Pain Orientation 1: Posterior  Pain Description 1: Aching, Constant  Pain Intervention(s) 1: Medication (see MAR)  Objective:  General: Alert,Ox4, cooperative, NAD. Wife at [place]: Atraumatic, PERRL, anicteric sclerae  Lungs: Bilateral expansion. Equal excursion. No accessory muscle use. Gastrointestinal:  Soft, non-tender, non-distended  Extremities:  Neurovasc exam WDL. + DP pulses. Sensation intact to light touch. Motor: + DF/PF          Calves non-tender upon palpation or with passive stretch. No significant erythema or swelling. Dressing: clean, dry and intact.     Vital Signs:   Visit Vitals    /84 (BP 1 Location: Right arm)    Pulse 83    Temp 99.2 °F (37.3 °C)    Resp 18    Ht 6' 1\" (1.854 m)    Wt 86.2 kg (190 lb)    SpO2 94%    BMI 25.07 kg/m2    O2 Flow Rate (L/min): 2 l/min O2 Device: Room air   Patient Vitals for the past 24 hrs:   BP Temp Pulse Resp SpO2   17 0832 144/84 99.2 °F (37.3 °C) 83 18 -   17 0209 152/51 99.1 °F (37.3 °C) 77 16 94 %   17 2047 143/83 98.6 °F (37 °C) 67 16 97 %   17 1402 141/71 98.1 °F (36.7 °C) 65 16 98 %     Temp (24hrs), Av.8 °F (37.1 °C), Min:98.1 °F (36.7 °C), Max:99.2 °F (37.3 °C)      LAB:   Recent Results (from the past 24 hour(s))   HEMOGLOBIN    Collection Time: 17  4:00 AM   Result Value Ref Range    HGB 12.2 12.1 - 17.0 g/dL      Lab Results   Component Value Date/Time    INR 1.1 2017 08:51 AM     Lab Results   Component Value Date/Time    HGB 12.2 2017 04:00 AM    HGB 11.8 2017 03:29 AM    HGB 14.7 2017 08:51 AM    HGB 14.5 2017 05:15 PM     Recent Labs      17   0329   NA  141   K  4.5   CL  109*   BUN  18   CREA  1.26   GLU  142*   CA  8.5       PT/OT:   Gait:  Gait  Base of Support: Widened  Speed/[place]: Pace decreased (<100 feet/min)  Step Length: Left shortened  Swing Pattern: Right asymmetrical  Stance: Right decreased, Left increased  Gait Abnormalities: Decreased step clearance, Antalgic  Ambulation - Level of Assistance: Contact guard assistance  Distance (ft): 300 Feet (ft)  Assistive Device: Walker, rolling, Gait belt  Rail Use: Both  Stairs - Level of Assistance: Contact guard assistance  Number of Stairs Trained: 4                 PATIENT MOBILITY  Bed Mobility Training  Supine to Sit: Supervision  Sit to Supine: Supervision  Scooting: Supervision  Transfer Training  Sit to Stand: Contact guard assistance (verbal cues to push from bed)  Stand to Sit: Contact guard assistance      Gait Training  Assistive Device: Walker, rolling, Gait belt  Ambulation - Level of Assistance: Contact guard assistance  Distance (ft): 300 Feet (ft)  Stairs - Level of Assistance: Contact guard assistance  Number of Stairs Trained: 4  Rail Use: Both   Weight Bearing Status  Right Side Weight Bearing: As tolerated  Left Side Weight Bearing: Full        Assessment and Plan    Principal Problem:    Primary localized osteoarthritis of right knee (8/22/2017)    Active Problems:    Dementia (8/22/2017)          POD#2 Procedure(s):  RIGHT TOTAL KNEE REPLACMENT AND REMOVAL OF HARDWEAR  Stable postop Labs trending as expected. VTE prophylaxis: ASA, Mobilization, Ankle pumping exercises   Weight bearing:  WBAT  Pain management:  Multi-modal pain plan, see above for medication,  Ice packs & elevation of extremity per orders, active gentle ROM & mobilize frequently for short periods of time. PT good progress  Wound benign. Neurovascularly intact. Progressing well. No indications of concerns. Continue present plans per orthopedic attending surgeon & interdisciplinary team for joint replacement. Discharge Planning: Expect home with Kelsea Vang today after morning PT session. Plans per Dr. Dallas Larose.     Signed By: Nickolas Scheuermann James Pablo, NP    RN, MSN, MA, Adult NP-BC

## 2017-08-24 NOTE — PROGRESS NOTES
Bedside shift change report given to Stiven Wade (oncoming nurse) by Dunia Torres (offgoing nurse). Report included the following information SBAR, Kardex and Recent Results.

## 2017-08-24 NOTE — PROGRESS NOTES
Bedside shift change report given to JILL Rowan (oncoming nurse) by Leticia Beckwith RN(offgoing nurse). Report given with SBAR.

## 2017-08-24 NOTE — PROGRESS NOTES
Spiritual Care Partner Volunteer visited patient in [address] [place] [address] [address] on 8/23/17. Documented by:  Pablo Aguila M.Div.    Paging Service [phone])

## 2017-08-25 ENCOUNTER — NURSE NAVIGATOR (OUTPATIENT)
Dept: OTHER | Age: 72
End: 2017-08-25

## 2017-08-25 NOTE — PROGRESS NOTES
This note will not be viewable in [address] Post Discharge Follow-up contact after Joint Replacement    Patient discharged on 8/24/17  By  Mayra Mendez   following  right knee Arthroplasty. Spoke with patient today, who reports they are \" doing fine. \"  Denies Fever, Shortness of Breath or Chest Pain. Home Health has visited. Patient also reports:. Incision  clean, dry, intact  Calf is non-tender,   operative extremity has minimal swelling. Pain is well managed. Discussed use of ice & elevation. is progressing with therapy and is exercising independently. Taking Aspirin for anticoagulation, oxycodone for pain. Patient   is not experiencing symptoms of constipation & urinating without difficulty. Discussed side effects of anticoagulants & pain medications (bleeding/bruising, constipation, lightheaded/dizziness)  Follow up appointment is not scheduled; but patient states plan to schedule. Discussed calling surgeon Dr Natalie Saleem  for drainage, bleeding, swelling in operative extremity, fever or pain. Discussed calling PCP Dr Ana Luisa Malone with other medical issues.

## 2017-09-20 NOTE — DISCHARGE SUMMARY
@[email or web address]@ [address] [place] [address]    DISCHARGE SUMMARY     Patient: Deng Hammond MD                             Medical Record Number: [id number]                : 1945  Age: 71 y.o. 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 Despite conservative management and after clinical and radiographic evaluation, it was determined that he suffered from end-stage osteoarthritis and would benefit from Procedure(s):  [address], which he consented to undergo after a discussion of the risks, benefits, alternatives, rehab concerns, and potential complications of surgery. Hospital Course:  Deng Hammond MD tolerated the procedure well. He was transferred  to the recovery room in stable condition. After a brief stay the patient was then transferred to the Joint Replacement Unit at [address] [place] [address].  On postoperative day #1, the dressing was clean and dry, he was neurovascularly intact. The patient was afebrile and vital signs were stable. Calves were soft and non-tender bilaterally. On postoperative day  # 2, the patient was tolerating a regular diet and making satisfactory progress with physical therapy. Patient made satisfactory progress with physical therapy and was discharged to Home in stable condition on postoperative day 2.   He was provided with routine postoperative instructions and advised to follow up in my office in 3 weeks following discharge from the hospital.  He was prescribed aspirin for DVT prophylaxis and oxycodone for post-operative pain. Discharge Medications:    ACETAMINOPHEN 500 mg Oral Q4HWA       ASPIRIN 325 mg Oral BID, For blood clot prevention. ATORVASTATIN CALCIUM 10 mg Oral DAILY        BUPROPION  mg Oral DAILY        CALCIUM CARBONATE/VITAMIN D3 600-125 mg-unit 1 Tab Oral DAILY        CHOLECALCIFEROL (VITAMIN D3) 2,000 unit 1 Tab Oral DAILY        DONEPEZIL HCL 5 mg Oral DAILY        ESCITALOPRAM OXALATE 20 mg Oral DAILY        FOLIC ACID 965 mcg Oral DAILY        MULTIVITAMIN 1 Tab Oral DAILY       OXYCODONE HCL 5-10 mg Oral Q4H PRN        PHOS. SERINE/OMEGA-3/DHA/EPA 1 Tab Oral DAILY       SENNOSIDES/DOCUSATE SODIUM 8.6-50 mg 1 Tab Oral BID        TERAZOSIN HCL 10 mg Oral QHS         Signed by: Maral Gee MD  9/20/2017

## 2017-09-25 ENCOUNTER — HOSPITAL ENCOUNTER (OUTPATIENT)
Dept: PHYSICAL THERAPY | Age: 72
Discharge: HOME | End: 2017-09-25
Payer: MEDICARE

## 2017-09-25 PROCEDURE — G8978 MOBILITY CURRENT STATUS: HCPCS

## 2017-09-25 PROCEDURE — G8979 MOBILITY GOAL STATUS: HCPCS

## 2017-09-25 PROCEDURE — 97161 PT EVAL LOW COMPLEX 20 MIN: CPT

## 2017-09-25 PROCEDURE — 97110 THERAPEUTIC EXERCISES: CPT

## 2017-09-25 NOTE — PROGRESS NOTES
[facility]  [address]  [place], [address] [facility]  Phone: [phone]  Fax: [phone]    Plan of Care/Statement of Necessity for Physical Therapy Services  2-15    Patient name: Bernardo Auguste MD  : 1945  Provider#: [id number]  Referral source: Tanner Wolf MD      Medical/Treatment Diagnosis: Pain in right knee [M25.561]     Prior Hospitalization: see medical history     Comorbidities: none  Prior Level of Function: able to jeffrey prol standing, prol walking, prol sitting, ascend/descend stairs, perform yard work & exercise w/out pain/limitation   Medications: Verified on Patient Summary List  Start of Care: 2017     Onset Date: 2017   The Plan of Care and following information is based on the information from the initial evaluation.     Assessment/ key information: pt is a 71year old male presents s/p R TKA DOS 2017    Evaluation Complexity History LOW Complexity : Zero comorbidities / personal factors that will impact the outcome / POC; Examination LOW Complexity : 1-2 Standardized tests and measures addressing body structure, function, activity limitation and / or participation in recreation  ;Presentation LOW Complexity : Stable, uncomplicated  ;Clinical Decision Making MEDIUM Complexity : FOTO score of 26-74  Overall Complexity Rating: LOW     Problem List: pain affecting function, decrease ROM, decrease strength, impaired gait/ balance, decrease ADL/ functional abilitiies, decrease activity tolerance, decrease flexibility/ joint mobility and decrease transfer abilities   Treatment Plan may include any combination of the following: Therapeutic exercise, Therapeutic activities, Neuromuscular re-education, Physical agent/modality, Gait/balance training, Manual therapy, Patient education, Self Care training, Functional mobility training, Home safety training and Stair training  Patient / Family readiness to learn indicated by: asking questions, trying to perform skills and interest  Persons(s) to be included in education: patient (P)  Barriers to Learning/Limitations: None  Patient Goal (s): \"good motion & no pain  Patient Self Reported Health Status: excellent  Rehabilitation Potential: good    Short Term Goals: To be accomplished in 2-3 weeks:  1) Patient will be independent with HEP  2) Patient will demonstrate quad set R=L for improved quad control with gait  3) Patient will increase R knee flex AROM >/= 90 degrees to improve sitting tolerance    Long Term Goals: To be accomplished in 6-8 weeks:  1) Patient will increase R knee flex AROM >/= 120 degrees for normal stair ambulation  2) Patient will report ascend/descend stairs without increase knee pain  3) Patient will demonstrate sit to stand transfer without UE assist w/out knee pain   4) Patient will amb >/= 1 mile without increase knee pain  5) Patient will demo independent with modified gym/exercise program without aggravation of symptoms      Frequency / Duration: Patient to be seen 1-3 times per week for 6-8 weeks. Patient/ Caregiver education and instruction: self care, activity modification, brace/ splint application and exercises    [x]  Plan of care has been reviewed with CARLO    G-Codes (GP)  Mobility   Current  CL= 60-79%   Goal  CK= 40-59%    The severity rating is based on clinical judgment and the FOTO Score. Certification Period: 9/25/2017-12/20/2017    Shara Sierra, PT 9/25/2017 4:30 PM    ________________________________________________________________________    I certify that the above Therapy Services are being furnished while the patient is under my care. I agree with the treatment plan and certify that this therapy is necessary.     [de-identified] Signature:____________________  Date:____________Time: _________

## 2017-09-25 NOTE — PROGRESS NOTES
PT INITIAL EVALUATION NOTE - [facility] 2-15    Patient Name: Florin Cr MD  Date:2017  : 1945  [x]  Patient  Verified  Payor: Lavonne  / Plan: [place] / Product Type: Medicare /    In time: 100 PM  Out time: 200 PM  Total Treatment Time (min): 60  Total Timed Codes (min): 15  1:1 Treatment Time ( only): 60   Visit #: 1     Treatment Area: Pain in right knee [M25.561]    SUBJECTIVE  Pain Level (0-10 scale): 0/10  Any medication changes, allergies to medications, adverse drug reactions, diagnosis change, or new procedure performed?: [] No    [x] Yes (see summary sheet for update)  Subjective:      Pt presents s/p R TKA DOS 2017, d/c home, home health PT 3x/wk, last visit 2017, best flex measuare 97 degrees; pt presents w/out AD, reports RW at home for ~ 2 weeks post surg    Pain:   6/10 max 0/10 min 0/10 now     Aggravated by: first few steps after sitting, prol standing >5 min, prol sitting w/ knee flexed, squatting    Eased by: extending, lying down  Location/description of symptoms: ache, knee ant prox shin     Diagnostic Tests: [] Lab work [x] X-rays    [] CT [] MRI     [] Other:  Results (per report of the patient): normal healing     PMH: Significant for ACL tear ~ , several R knee ax surgery, ACL reconstruction w/ patellar tendon    Social/Recreation/Work: pt is retired; works part time [employer], ~ 2 hours shift 1 day per wk, cont normal duty; pt has access to [employer] TM walking 2-3x/wk 30 min, UE resistance machine; at home stairs & elevator, has been using elevator primarily, when uses stairs step to gait pattern; pt reports difficulty w/ donning/tieing shoes; prior to surgery yard work including mowing and trimming     Prior level of function: able to jeffrey prol standing, prol walking, prol sitting, ascend/descend stairs, perform yard work & exercise w/out pain/limitation     Patient goal(s): \"good motion & no pain\" OBJECTIVE/EXAMINATION    Observation:   Incision ant aspect R knee approximated and healing well     Movement/gait:   Antalgic R, decr stance, decr step length, decr knee flex during swing      ROM:  [] Unable to assess                 AAROM                                           Right Left   Hip Flexion wnl wnl    Extension wnl wnl    Abduction wnl wnl    Adduction wnl wnl    IR wnl wnl    ER wnl wnl   Knee Flexion 80 pain 147     Extension 0 2 (hyper)       Strength:  [] Unable to assess    Right Left   Hip Flexion 5 5    Extension 4 5    Abduction 4 4    Adduction nt nt    IR nt nt    ER nt nt   Knee Flexion 4 5    Extension 4 5       SLR R 4/5 L 5/5   QS R fair L good     Palpation:   No specific tenderness to palpation     Joint Mobility:  decr patellar mobility R comp L all motions     Girth Measurements: (in)    2 in superior    Midpatellar 2 in inferior    Right 17 1/2 17 1/2  15 1/4   Left  16 1/2  16 1/2 14 3/4          Outcome Measure: Patient presents with an initial FOTO score 31/100    15 Min Therapeutic Exercise:  [x] See flow sheet : patient education, review/instruction HEP    Rationale: increase ROM, increase strength, improve coordination, improve balance and increase proprioception to improve the patients ability to jeffrey prol standing, prol walking, prol sitting, ascend/descend stairs, perform yard work & exercise w/out pain/limitation          With   [x] TE   [] TA   [] neuro   [] other: Patient Education: [x] Review HEP    [] Progressed/Changed HEP based on:   [x] positioning   [x] body mechanics   [] transfers   [x] heat/ice application    [x] other:  claudine/path, POC and role of PT, activity modification, postural principles, sleeping position         Pain Level (0-10 scale) post treatment: 0/10      ASSESSMENT:      [x]  See Plan of [employer] [facility], PT 9/25/2017  12:57 PM

## 2017-09-28 ENCOUNTER — HOSPITAL ENCOUNTER (OUTPATIENT)
Dept: PHYSICAL THERAPY | Age: 72
Discharge: HOME | End: 2017-09-28
Payer: MEDICARE

## 2017-09-28 PROCEDURE — 97140 MANUAL THERAPY 1/> REGIONS: CPT | Performed by: PHYSICAL THERAPY ASSISTANT

## 2017-09-28 PROCEDURE — 97110 THERAPEUTIC EXERCISES: CPT | Performed by: PHYSICAL THERAPY ASSISTANT

## 2017-09-28 NOTE — PROGRESS NOTES
PT DAILY TREATMENT NOTE - [facility] 2-15    Patient Name: Roel Velázquez MD  Date:2017  : 1945  [x]  Patient  Verified  Payor: Nathalia Luis / Plan: [place] MEDICARE PART A & B / Product Type: Medicare /    In time: 10:00 AM  Out time:11:10 AM  Total Treatment Time (min): 70  Total Timed Codes (min): 60  1:1 Treatment Time ( [address] only): 54   Visit #: 2     Treatment Area: Pain in right knee [M25.561]    SUBJECTIVE  Pain Level (0-10 scale): 1/10  Any medication changes, allergies to medications, adverse drug reactions, diagnosis change, or new procedure performed?: [x] No    [] Yes (see summary sheet for update)  Subjective functional status/changes:   [] No changes reported  Patient reports compliance with HEP and ice application, minimal pain in his knee. Took tylenol yesterday \"I iced and did my exercises 3 times yesterday. \" States going to his grandchild's soccer game this weekend. States he is going to work after PT session today.     OBJECTIVE    Modality rationale: decrease edema, decrease inflammation and decrease pain to improve the patients ability to able to jeffrey prol standing, prol walking, prol sitting, ascend/descend stairs, perform yard work & exercise w/out pain/limitation    Min Type Additional Details    [] Estim: []Att   []Unatt        []TENS instruct                  []IFC  []Premod   []NMES                     []Other:  []w/US   []w/ice   []w/heat  Position:  Location:    []  Traction: [] Cervical       []Lumbar                       [] Prone          []Supine                       []Intermittent   []Continuous Lbs:  [] before manual  [] after manual  []w/heat    []  Ultrasound: []Continuous   [] Pulsed at:                           []1MHz   []3MHz Location:  W/cm2:    [] Paraffin         Location:   []w/heat   10 [x]  Ice     []  Heat  []  Ice massage Position: supine with LE's elevated  Location:R knee    []  Laser  []  Other: Position:  Location:      []  Vasopneumatic Device Pressure:       [] lo [] med [] hi   Temperature:      [x] Skin assessment post-treatment:  [x]intact []redness- no adverse reaction    []redness  adverse reaction:     40 min Therapeutic Exercise:  [x] See flow sheet : added recumbent elliptical, TKE at stairs with green band, reviewed HEP   Rationale: increase ROM, increase strength, improve coordination, improve balance and increase proprioception to improve the patients ability to able to jeffrey prol standing, prol walking, prol sitting, ascend/descend stairs, perform yard work & exercise w/out pain/limitation     15 min Manual Therapy: R Patella mobs, STM R Pes Anserine insertion and medial patella retinaculum, PROM flexion and extension   Rationale: decrease pain, increase ROM and increase tissue extensibility to improve the patients ability to able to jeffrey prol standing, prol walking, prol sitting, ascend/descend stairs, perform yard work & exercise w/out pain/limitation     5 min Gait Training:  _100__ feet without__ device on level surfaces independently    Stepping drill x 1 minute using mirror for visual feedback cues for DF at heel strike, push off thru 1st MTP   Rationale: increase strength, improve coordination and gait mechanics  to improve the patients ability to able to jeffrey prol standing, prol walking, prol sitting, ascend/descend stairs, perform yard work & exercise w/out pain/limitation           With   [x] TE   [] TA   [] neuro   [] other: Patient Education: [x] Review HEP    [] Progressed/Changed HEP based on:   [] positioning   [] body mechanics   [] transfers   [x] heat/ice application    [x] other: avoid resting in hip ER, bringing fold up chairs to soccer game this weekend to avoid prolonged standing,     Other Objective/Functional Measures: TTP R Pes.  Anserine insertion, R medial patella retinaculum     Pain Level (0-10 scale) post treatment: 1/10    ASSESSMENT/Changes in Function:   Reviewed gait mechanics which improved after verbal cues and use of mirror for visual feedback. Patient able to do full revolution of recumbent elliptical today. Overall tolerated session well. Patient will continue to benefit from skilled PT services to modify and progress therapeutic interventions, address functional mobility deficits, address ROM deficits, address strength deficits, analyze and cue movement patterns and instruct in home and community integration to attain remaining goals.      []  See Plan of Care  []  See progress note/recertification  []  See Discharge Summary         Progress towards goals / Updated goals:  nt    PLAN  []  Upgrade activities as tolerated     [x]  Continue plan of care  []  Update interventions per flow sheet       []  Discharge due to:_  []  Other:_      Michaelle Graham, CARLO 9/28/2017  10:00 AM

## 2017-10-02 ENCOUNTER — HOSPITAL ENCOUNTER (OUTPATIENT)
Dept: PHYSICAL THERAPY | Age: 72
Discharge: HOME | End: 2017-10-02
Payer: MEDICARE

## 2017-10-02 PROCEDURE — 97110 THERAPEUTIC EXERCISES: CPT | Performed by: PHYSICAL THERAPY ASSISTANT

## 2017-10-02 PROCEDURE — 97140 MANUAL THERAPY 1/> REGIONS: CPT | Performed by: PHYSICAL THERAPY ASSISTANT

## 2017-10-02 NOTE — PROGRESS NOTES
PT DAILY TREATMENT NOTE - [facility] 2-15    Patient Name: Marylen Saras, MD  Date:10/2/2017  : 1945  [x]  Patient  Verified  Payor: Camila Lam / Plan: [place] MEDICARE PART A & B / Product Type: Medicare /    In time: 4:00 PM  Out time: 5:15 PM  Total Treatment Time (min): 75  Total Timed Codes (min): 65  1:1 Treatment Time ( [address] only): 55   Visit #: 3     Treatment Area: Pain in right knee [M25.561]    SUBJECTIVE  Pain Level (0-10 scale): 1-2/10  Any medication changes, allergies to medications, adverse drug reactions, diagnosis change, or new procedure performed?: [x] No    [] Yes (see summary sheet for update)  Subjective functional status/changes:   [] No changes reported  Patient reports \"I feel like I'm walking better since we practiced it last visit. \" States he feels sore from increased walking and being in a dependent position a lot today.     OBJECTIVE    Modality rationale: decrease edema, decrease inflammation and decrease pain to improve the patients ability to able to jeffrey prol standing, prol walking, prol sitting, ascend/descend stairs, perform yard work & exercise w/out pain/limitation    Min Type Additional Details    [] Estim: []Att   []Unatt        []TENS instruct                  []IFC  []Premod   []NMES                     []Other:  []w/US   []w/ice   []w/heat  Position:  Location:    []  Traction: [] Cervical       []Lumbar                       [] Prone          []Supine                       []Intermittent   []Continuous Lbs:  [] before manual  [] after manual  []w/heat    []  Ultrasound: []Continuous   [] Pulsed at:                           []1MHz   []3MHz Location:  W/cm2:    [] Paraffin         Location:   []w/heat   10 [x]  Ice     []  Heat  []  Ice massage Position: supine with LE's elevated  Location:R knee    []  Laser  []  Other: Position:  Location:      []  Vasopneumatic Device Pressure:       [] lo [] med [] hi   Temperature:      [x] Skin assessment post-treatment: [x]intact []redness- no adverse reaction    []redness  adverse reaction:     45 min Therapeutic Exercise:  [x] See flow sheet : added recumbent elliptical, TKE at stairs with green band, reviewed HEP   Rationale: increase ROM, increase strength, improve coordination, improve balance and increase proprioception to improve the patients ability to able to jeffrey prol standing, prol walking, prol sitting, ascend/descend stairs, perform yard work & exercise w/out pain/limitation     20 min Manual Therapy: R Patella mobs, STM R Pes Anserine insertion and medial patella retinaculum, PROM flexion and extension, gentle posterior femur glides of tibia with towel roll for comfort   Rationale: decrease pain, increase ROM and increase tissue extensibility to improve the patients ability to able to jeffrey prol standing, prol walking, prol sitting, ascend/descend stairs, perform yard work & exercise w/out pain/limitation      min Gait Training:  _100__ feet without__ device on level surfaces independently    Stepping drill x 1 minute using mirror for visual feedback cues for DF at heel strike, push off thru 1st MTP   Rationale: increase strength, improve coordination and gait mechanics  to improve the patients ability to able to jeffrey prol standing, prol walking, prol sitting, ascend/descend stairs, perform yard work & exercise w/out pain/limitation           With   [x] TE   [] TA   [] neuro   [] other: Patient Education: [x] Review HEP    [] Progressed/Changed HEP based on:   [] positioning   [] body mechanics   [] transfers   [x] heat/ice application    [x] other: avoid resting in hip ER, bringing fold up chairs to soccer game this weekend to avoid prolonged standing,     Other Objective/Functional Measures:     Pain Level (0-10 scale) post treatment: 0/10    ASSESSMENT/Changes in Function:   Fatigue with progressed exercises but able to perform without increased pain.  Challenged with addition of balance/proprioception exercises ~ SLS and tandem balance. Decreased TTP along R pes Anserine insertion and medial patella retinaculum today. Patient will continue to benefit from skilled PT services to modify and progress therapeutic interventions, address functional mobility deficits, address ROM deficits, address strength deficits, analyze and cue movement patterns and instruct in home and community integration to attain remaining goals.      []  See Plan of Care  []  See progress note/recertification  []  See Discharge Summary         Progress towards goals / Updated goals:  nt    PLAN  []  Upgrade activities as tolerated     [x]  Continue plan of care  []  Update interventions per flow sheet       []  Discharge due to:_  []  Other:_      Sheila Morgan, CARLO 10/2/2017  4:00 PM

## 2017-10-04 ENCOUNTER — HOSPITAL ENCOUNTER (OUTPATIENT)
Dept: PHYSICAL THERAPY | Age: 72
Discharge: HOME | End: 2017-10-04
Payer: MEDICARE

## 2017-10-04 PROCEDURE — 97140 MANUAL THERAPY 1/> REGIONS: CPT

## 2017-10-04 PROCEDURE — 97110 THERAPEUTIC EXERCISES: CPT

## 2017-10-04 NOTE — PROGRESS NOTES
PT DAILY TREATMENT NOTE - [facility] 2-15    Patient Name: Lupillo Quezada MD  Date:10/4/2017  : 1945  [x]  Patient  Verified  Payor: Ramesh Whaley / Plan: [place] MEDICARE PART A & B / Product Type: Medicare /    In time: 0 PM  Out time: 350 PM  Total Treatment Time (mIn): 80  Total Timed Codes (min): 60  1:1 Treatment Time ( only): 60  Visit #: 4    Treatment Area: Pain in right knee [M25.561]    SUBJECTIVE  Pain Level (0-10 scale): 1/10  Any medication changes, allergies to medications, adverse drug reactions, diagnosis change, or new procedure performed?: [x] No    [] Yes (see summary sheet for update)  Subjective functional status/changes:   [] No changes reported  Patient reports \"its always there\" in regards to R knee discomfort but no signif aggravation of sx after last visit or w/ incr activity over the weekend     OBJECTIVE    Modality rationale: decrease edema, decrease inflammation and decrease pain to improve the patients ability to able to jeffrey prol standing, prol walking, prol sitting, ascend/descend stairs, perform yard work & exercise w/out pain/limitation    Min Type Additional Details    [] Estim: []Att   []Unatt        []TENS instruct                  []IFC  []Premod   []NMES                     []Other:  []w/US   []w/ice   []w/heat  Position:  Location:    []  Traction: [] Cervical       []Lumbar                       [] Prone          []Supine                       []Intermittent   []Continuous Lbs:  [] before manual  [] after manual  []w/heat    []  Ultrasound: []Continuous   [] Pulsed at:                           []1MHz   []3MHz Location:  W/cm2:    [] Paraffin         Location:   []w/heat   10 [x]  Ice     []  Heat  []  Ice massage Position: supine with LE's elevated  Location:R knee    []  Laser  []  Other: Position:  Location:      []  Vasopneumatic Device Pressure:       [] lo [] med [] hi   Temperature:      [x] Skin assessment post-treatment:  [x]intact []redness- no adverse reaction    []redness  adverse reaction:     40 min Therapeutic Exercise:  [x] See flow sheet : add standing HC, knee flex stretch at step, incr resistance/reps as per chart    Rationale: increase ROM, increase strength, improve coordination, improve balance and increase proprioception to improve the patients ability to able to jeffrey prol standing, prol walking, prol sitting, ascend/descend stairs, perform yard work & exercise w/out pain/limitation     20 min Manual Therapy: R Patella mobs, STM R Pes Anserine insertion and medial patella retinaculum, PROM flexion w/ sup heel slide and at edge of table, PROM extension, gentle posterior femur glides of tibia with towel roll for comfort   Rationale: decrease pain, increase ROM and increase tissue extensibility to improve the patients ability to able to jeffrey prol standing, prol walking, prol sitting, ascend/descend stairs, perform yard work & exercise w/out pain/limitation     NT min Gait Training:  _100__ feet without__ device on level surfaces independently    Stepping drill x 1 minute using mirror for visual feedback cues for DF at heel strike, push off thru 1st MTP   Rationale: increase strength, improve coordination and gait mechanics  to improve the patients ability to able to jeffrey prol standing, prol walking, prol sitting, ascend/descend stairs, perform yard work & exercise w/out pain/limitation           With   [x] TE   [] TA   [] neuro   [] other: Patient Education: [x] Review HEP    [] Progressed/Changed HEP based on:   [] positioning   [] body mechanics   [] transfers   [x] heat/ice application    [] other:      Other Objective/Functional Measures:     Knee flex PROM 103 degrees     Pain Level (0-10 scale) post treatment: 1-2/10    ASSESSMENT/Changes in Function:     Cueing to maintain quad set & avoid ext lag w/ SLR; progressing well w/ knee flex, incr to 103 degrees today     Patient will continue to benefit from skilled PT services to modify and progress therapeutic interventions, address functional mobility deficits, address ROM deficits, address strength deficits, analyze and cue movement patterns and instruct in home and community integration to attain remaining goals.      []  See Plan of Care  []  See progress note/recertification  []  See Discharge Summary         Progress towards goals / Updated goals:  nt    PLAN  []  Upgrade activities as tolerated     [x]  Continue plan of care  []  Update interventions per flow sheet       []  Discharge due to:_  []  Other:_      Harpreet Long, PT 10/4/2017  245 PM

## 2017-10-06 ENCOUNTER — APPOINTMENT (OUTPATIENT)
Dept: PHYSICAL THERAPY | Age: 72
End: 2017-10-06
Payer: MEDICARE

## 2017-10-06 ENCOUNTER — HOSPITAL ENCOUNTER (OUTPATIENT)
Dept: PHYSICAL THERAPY | Age: 72
Discharge: HOME | End: 2017-10-06
Payer: MEDICARE

## 2017-10-06 PROCEDURE — 97110 THERAPEUTIC EXERCISES: CPT | Performed by: PHYSICAL THERAPY ASSISTANT

## 2017-10-06 PROCEDURE — 97140 MANUAL THERAPY 1/> REGIONS: CPT | Performed by: PHYSICAL THERAPY ASSISTANT

## 2017-10-06 NOTE — PROGRESS NOTES
PT DAILY TREATMENT NOTE - [facility] 2-15    Patient Name: Rancho Escalante MD  Date:10/6/2017  : 1945  [x]  Patient  Verified  Payor: Prem Solders / Plan: [place] / Product Type: Medicare /    In time: 8:30 AM  Out time: 9:50 AM  Total Treatment Time (mIn): 80  Total Timed Codes (min): 70  1:1 Treatment Time ( [address] only): 40  Visit #: 5    Treatment Area: Pain in right knee [M25.561]    SUBJECTIVE  Pain Level (0-10 scale): 1/10  Any medication changes, allergies to medications, adverse drug reactions, diagnosis change, or new procedure performed?: [x] No    [] Yes (see summary sheet for update)  Subjective functional status/changes:   [] No changes reported  Patient reports he is going to a  today and will have a lot of sititng/standing. \"The same discomfort but otherwise good. \"    OBJECTIVE    Modality rationale: decrease edema, decrease inflammation and decrease pain to improve the patients ability to able to jeffrey prol standing, prol walking, prol sitting, ascend/descend stairs, perform yard work & exercise w/out pain/limitation    Min Type Additional Details    [] Estim: []Att   []Unatt        []TENS instruct                  []IFC  []Premod   []NMES                     []Other:  []w/US   []w/ice   []w/heat  Position:  Location:    []  Traction: [] Cervical       []Lumbar                       [] Prone          []Supine                       []Intermittent   []Continuous Lbs:  [] before manual  [] after manual  []w/heat    []  Ultrasound: []Continuous   [] Pulsed at:                           []1MHz   []3MHz Location:  W/cm2:    [] Paraffin         Location:   []w/heat   10 [x]  Ice     []  Heat  []  Ice massage Position: supine with LE's elevated  Location:R knee    []  Laser  []  Other: Position:  Location:      []  Vasopneumatic Device Pressure:       [] lo [] med [] hi   Temperature:      [x] Skin assessment post-treatment:  [x]intact []redness- no adverse reaction []redness  adverse reaction:     50 min Therapeutic Exercise:  [x] See flow sheet : add TG LP at 28 degrees. Rationale: increase ROM, increase strength, improve coordination, improve balance and increase proprioception to improve the patients ability to able to jeffrey prol standing, prol walking, prol sitting, ascend/descend stairs, perform yard work & exercise w/out pain/limitation     20 min Manual Therapy: R Patella mobs, STM R Pes Anserine insertion and medial patella retinaculum, PROM flexion w/ sup heel slide and at edge of table, PROM extension, gentle posterior femur glides of tibia with towel roll for comfort   Rationale: decrease pain, increase ROM and increase tissue extensibility to improve the patients ability to able to jeffrey prol standing, prol walking, prol sitting, ascend/descend stairs, perform yard work & exercise w/out pain/limitation     NT min Gait Training:  _100__ feet without__ device on level surfaces independently    Stepping drill x 1 minute using mirror for visual feedback cues for DF at heel strike, push off thru 1st MTP   Rationale: increase strength, improve coordination and gait mechanics  to improve the patients ability to able to jeffrey prol standing, prol walking, prol sitting, ascend/descend stairs, perform yard work & exercise w/out pain/limitation           With   [x] TE   [] TA   [] neuro   [] other: Patient Education: [x] Review HEP    [] Progressed/Changed HEP based on:   [] positioning   [] body mechanics   [] transfers   [x] heat/ice application    [] other:      Other Objective/Functional Measures:     Knee flex PROM 105  degrees     Effusion:   Mid Patella: 44 cm  Sup. Patella: 45.5 cm    Pain Level (0-10 scale) post treatment: 1-2/10    ASSESSMENT/Changes in Function:   Patient tolerated TG LP well and reporting \"That one felt real good. \" Decreased swelling in R knee and ROM steadily improving.        Patient will continue to benefit from skilled PT services to modify and progress therapeutic interventions, address functional mobility deficits, address ROM deficits, address strength deficits, analyze and cue movement patterns and instruct in home and community integration to attain remaining goals.      []  See Plan of Care  []  See progress note/recertification  []  See Discharge Summary         Progress towards goals / Updated goals:  nt    PLAN  []  Upgrade activities as tolerated     [x]  Continue plan of care  []  Update interventions per flow sheet       []  Discharge due to:_  []  Other:_      Wil Kiser, PTA 10/6/2017  8:30 AM

## 2017-10-09 ENCOUNTER — APPOINTMENT (OUTPATIENT)
Dept: PHYSICAL THERAPY | Age: 72
End: 2017-10-09
Payer: MEDICARE

## 2017-10-10 ENCOUNTER — HOSPITAL ENCOUNTER (OUTPATIENT)
Dept: PHYSICAL THERAPY | Age: 72
Discharge: HOME | End: 2017-10-10
Payer: MEDICARE

## 2017-10-10 PROCEDURE — 97110 THERAPEUTIC EXERCISES: CPT

## 2017-10-10 PROCEDURE — 97140 MANUAL THERAPY 1/> REGIONS: CPT

## 2017-10-10 NOTE — PROGRESS NOTES
PT DAILY TREATMENT NOTE - [facility] 215    Patient Name: Johnnie Hayes MD  Date:10/10/2017  : 1945  [x]  Patient  Verified  Payor: Babak Jeter / Plan: [place] MEDICARE PART A & B / Product Type: Medicare /    In time: 1000 AM  Out time: 0 AM  Total Treatment Time (mIn): 70  Total Timed Codes (min): 65  1:1 Treatment Time ( only): 40  Visit #: 6    Treatment Area: Pain in right knee [M25.561]    SUBJECTIVE  Pain Level (0-10 scale): <1/10  Any medication changes, allergies to medications, adverse drug reactions, diagnosis change, or new procedure performed?: [x] No    [] Yes (see summary sheet for update)  Subjective functional status/changes:   [] No changes reported  Patient reports stiffness over weekend w/ prol sitting w/ knee flexed while playing games with granddaughter; RTMD tomorrow      OBJECTIVE    Modality rationale: decrease edema, decrease inflammation and decrease pain to improve the patients ability to able to jeffrey prol standing, prol walking, prol sitting, ascend/descend stairs, perform yard work & exercise w/out pain/limitation    Min Type Additional Details    [] Estim: []Att   []Unatt        []TENS instruct                  []IFC  []Premod   []NMES                     []Other:  []w/US   []w/ice   []w/heat  Position:  Location:    []  Traction: [] Cervical       []Lumbar                       [] Prone          []Supine                       []Intermittent   []Continuous Lbs:  [] before manual  [] after manual  []w/heat    []  Ultrasound: []Continuous   [] Pulsed at:                           []1MHz   []3MHz Location:  W/cm2:    [] Paraffin         Location:   []w/heat   declined [x]  Ice     []  Heat  []  Ice massage Position: supine with LE's elevated  Location:R knee    []  Laser  []  Other: Position:  Location:      []  Vasopneumatic Device Pressure:       [] lo [] med [] hi   Temperature:      [x] Skin assessment post-treatment:  [x]intact []redness- no adverse reaction []redness  adverse reaction:     45 min Therapeutic Exercise:  [x] See flow sheet : brief reassessment performed    Rationale: increase ROM, increase strength, improve coordination, improve balance and increase proprioception to improve the patients ability to able to jeffrey prol standing, prol walking, prol sitting, ascend/descend stairs, perform yard work & exercise w/out pain/limitation     20 min Manual Therapy: R Patella mobs, STM R Pes Anserine insertion and medial patella retinaculum, PROM flexion w/ sup heel slide and at edge of table, PROM extension, gentle posterior femur glides of tibia with towel roll for comfort   Rationale: decrease pain, increase ROM and increase tissue extensibility to improve the patients ability to able to jeffrey prol standing, prol walking, prol sitting, ascend/descend stairs, perform yard work & exercise w/out pain/limitation     NT min Gait Training:  _100__ feet without__ device on level surfaces independently    Stepping drill x 1 minute using mirror for visual feedback cues for DF at heel strike, push off thru 1st MTP   Rationale: increase strength, improve coordination and gait mechanics  to improve the patients ability to able to jeffrey prol standing, prol walking, prol sitting, ascend/descend stairs, perform yard work & exercise w/out pain/limitation           With   [x] TE   [] TA   [] neuro   [] other: Patient Education: [x] Review HEP    [] Progressed/Changed HEP based on:   [] positioning   [] body mechanics   [] transfers   [x] heat/ice application    [] other:      Other Objective/Functional Measures:     ROM   R kneee 0-107, pain/guarding w/ end range flex     Strength  Quad set R good, mild decr comp L   SLR 4/5, able to maintain ext ~ 1 rep  MMT knee flex, ext 4/5     Gait   Antalgic R, greatest initially, movement improves     Observation   Incision well approximated, healing well   Edema R knee per observation     Palpation  Tender med knee @ pes anserine, infrapatellar fat pad glides        Pain Level (0-10 scale) post treatment: 0/10    ASSESSMENT/Changes in Function:   Patient has been seen for 6 skilled PT visits; he demonstrates incr ROM, incr quad/LE strength, improved gait mechanics and stability in stance, decr report of pain; he cont to have signif limitation w/ R knee flex due to pain/guarding, edema R knee, report of pain and functional limitation     Patient will continue to benefit from skilled PT services to modify and progress therapeutic interventions, address functional mobility deficits, address ROM deficits, address strength deficits, analyze and cue movement patterns and instruct in home and community integration to attain remaining goals.      []  See Plan of Care  []  See progress note/recertification  []  See Discharge Summary         Progress towards goals / Updated goals:  nt    PLAN  []  Upgrade activities as tolerated     [x]  Continue plan of care  []  Update interventions per flow sheet       []  Discharge due to:_  []  Other:_      Nisa Thompson, PT 10/10/2017  11:30 AM

## 2017-10-10 NOTE — PROGRESS NOTES
[facility]   [address]  [place], [address]  Phone: [phone] Fax: [phone]    Progress Note    Name: Sameera Jackson MD   : 1945   MD: Dung Ahn MD     Treatment Diagnosis: Pain in right knee [M25.561]  Start of Care: 2017    Visits from Start of Care: 6  Missed Visits: 0    At today's visit patient presents with the following Objective/Functional measures:     ROM   R kneee 0-107, pain/guarding w/ end range flex     Strength  Quad set R good, mild decr comp L   SLR 4/5, able to maintain ext ~ 1 rep  MMT knee flex, ext 4/5     Gait   Antalgic R, greatest initially, movement improves     Observation   Incision well approximated, healing well   Edema R knee per observation     Palpation  Tender med knee @ pes anserine, infrapatellar fat pad glides      Assessment / Recommendations:     Patient has been seen for 6 skilled PT visits; he demonstrates incr ROM, incr quad/LE strength, improved gait mechanics and stability in stance, decr report of pain; he cont to have signif limitation w/ R knee flex due to pain/guarding, edema R knee, report of pain and functional limitation     Recommend continue PT 2 x/wk for 4-6 weeks. Tonya Morel, PT 10/10/2017 1:59 PM    ________________________________________________________________________  NOTE TO PHYSICIAN:  Please complete the following and fax to: [facility]: Fax: [phone]. Dwayne Desai Retain this original for your records. If you are unable to process this request in 24 hours, please contact our office.        ____ I have read the above report and request that my patient continue therapy with the following changes/special instructions:  ____ I have read the above report and request that my patient be discharged from therapy    Physician's Signature:_________________ Date:___________Time:__________

## 2017-10-11 ENCOUNTER — APPOINTMENT (OUTPATIENT)
Dept: PHYSICAL THERAPY | Age: 72
End: 2017-10-11
Payer: MEDICARE

## 2017-10-13 ENCOUNTER — HOSPITAL ENCOUNTER (OUTPATIENT)
Dept: PHYSICAL THERAPY | Age: 72
Discharge: HOME | End: 2017-10-13
Payer: MEDICARE

## 2017-10-13 PROCEDURE — 97110 THERAPEUTIC EXERCISES: CPT

## 2017-10-13 PROCEDURE — 97140 MANUAL THERAPY 1/> REGIONS: CPT

## 2017-10-13 NOTE — PROGRESS NOTES
PT DAILY TREATMENT NOTE - [facility] 2-15    Patient Name: Patricia Villareal MD  Date:10/13/2017  : 1945  [x]  Patient  Verified  Payor: Dionicio Canales / Plan: [place] MEDICARE PART A & B / Product Type: Medicare /    In time: 56 AM  Out time: 46 AM  Total Treatment Time (mIn): 65  Total Timed Codes (min): 60  1:1 Treatment Time ( only): 40  Visit #: 7    Treatment Area: Pain in right knee [M25.561]    SUBJECTIVE  Pain Level (0-10 scale): 2-310  Any medication changes, allergies to medications, adverse drug reactions, diagnosis change, or new procedure performed?: [x] No    [] Yes (see summary sheet for update)  Subjective functional status/changes:   [] No changes reported  Patient reports incr soreness today \"not sure why\" however pts wife notes he was cleaning garage yesterday; MD visit went well however pts wife reports they were told he needs to get bend in 3 months or they will have to do surgery      OBJECTIVE    Modality rationale: decrease edema, decrease inflammation and decrease pain to improve the patients ability to able to jeffrey prol standing, prol walking, prol sitting, ascend/descend stairs, perform yard work & exercise w/out pain/limitation    Min Type Additional Details    [] Estim: []Att   []Unatt        []TENS instruct                  []IFC  []Premod   []NMES                     []Other:  []w/US   []w/ice   []w/heat  Position:  Location:    []  Traction: [] Cervical       []Lumbar                       [] Prone          []Supine                       []Intermittent   []Continuous Lbs:  [] before manual  [] after manual  []w/heat    []  Ultrasound: []Continuous   [] Pulsed at:                           []1MHz   []3MHz Location:  W/cm2:    [] Paraffin         Location:   []w/heat   To go [x]  Ice     []  Heat  []  Ice massage Position: supine with LE's elevated  Location:R knee    []  Laser  []  Other: Position:  Location:      []  Vasopneumatic Device Pressure:       [] lo [] med [] hi   Temperature:      [x] Skin assessment post-treatment:  [x]intact []redness- no adverse reaction    []redness  adverse reaction:     40 min Therapeutic Exercise:  [x] See flow sheet : incr reps/resistance as per chart, change quad set to SAQ   Rationale: increase ROM, increase strength, improve coordination, improve balance and increase proprioception to improve the patients ability to able to jeffrey prol standing, prol walking, prol sitting, ascend/descend stairs, perform yard work & exercise w/out pain/limitation     20 min Manual Therapy: R Patella mobs, STM R Pes Anserine insertion and medial patella retinaculum, PROM flexion w/ sup heel slide and at edge of table, PROM extension, gentle posterior femur glides of tibia with towel roll for comfort   Rationale: decrease pain, increase ROM and increase tissue extensibility to improve the patients ability to able to jeffrey prol standing, prol walking, prol sitting, ascend/descend stairs, perform yard work & exercise w/out pain/limitation     NT min Gait Training:  _100__ feet without__ device on level surfaces independently    Stepping drill x 1 minute using mirror for visual feedback cues for DF at heel strike, push off thru 1st MTP   Rationale: increase strength, improve coordination and gait mechanics  to improve the patients ability to able to jeffrey prol standing, prol walking, prol sitting, ascend/descend stairs, perform yard work & exercise w/out pain/limitation           With   [x] TE   [] TA   [] neuro   [] other: Patient Education: [x] Review HEP    [] Progressed/Changed HEP based on:   [] positioning   [] body mechanics   [] transfers   [x] heat/ice application    [] other:      Other Objective/Functional Measures:   nt    Pain Level (0-10 scale) post treatment: 0/10    ASSESSMENT/Changes in Function:     incr tenderness w/ PROM today; jeffrey progression of ex well    Patient will continue to benefit from skilled PT services to modify and progress therapeutic interventions, address functional mobility deficits, address ROM deficits, address strength deficits, analyze and cue movement patterns and instruct in home and community integration to attain remaining goals.      []  See Plan of Care  []  See progress note/recertification  []  See Discharge Summary         Progress towards goals / Updated goals:  nt    PLAN  []  Upgrade activities as tolerated     [x]  Continue plan of care  []  Update interventions per flow sheet       []  Discharge due to:_  []  Other:_      Didi Baldwin, PT 10/13/2017  10:30 AM

## 2017-10-16 ENCOUNTER — HOSPITAL ENCOUNTER (OUTPATIENT)
Dept: PHYSICAL THERAPY | Age: 72
Discharge: HOME | End: 2017-10-16
Payer: MEDICARE

## 2017-10-16 PROCEDURE — 97110 THERAPEUTIC EXERCISES: CPT | Performed by: PHYSICAL THERAPY ASSISTANT

## 2017-10-16 PROCEDURE — 97140 MANUAL THERAPY 1/> REGIONS: CPT | Performed by: PHYSICAL THERAPY ASSISTANT

## 2017-10-16 NOTE — PROGRESS NOTES
PT DAILY TREATMENT NOTE - [facility] 215    Patient Name: Roel Velázquez MD  Date:10/16/2017  : 1945  [x]  Patient  Verified  Payor: Nathalia Daltonderic / Plan: [place] MEDICARE PART A & B / Product Type: Medicare /    In time: 9:55 AM  Out time: 200 AM  Total Treatment Time (mIn): 80  Total Timed Codes (min): 70  1:1 Treatment Time ( [address] only): 60  Visit #: 8    Treatment Area: Pain in right knee [M25.561]    SUBJECTIVE  Pain Level (0-10 scale): 1/10  Any medication changes, allergies to medications, adverse drug reactions, diagnosis change, or new procedure performed?: [x] No    [] Yes (see summary sheet for update)  Subjective functional status/changes:   [] No changes reported  Patient states he cut the grass this weekend and organized his garage, feeling more sore on L knee vs R knee today.       OBJECTIVE    Modality rationale: decrease edema, decrease inflammation and decrease pain to improve the patients ability to able to jeffrey prol standing, prol walking, prol sitting, ascend/descend stairs, perform yard work & exercise w/out pain/limitation    Min Type Additional Details    [] Estim: []Att   []Unatt        []TENS instruct                  []IFC  []Premod   []NMES                     []Other:  []w/US   []w/ice   []w/heat  Position:  Location:    []  Traction: [] Cervical       []Lumbar                       [] Prone          []Supine                       []Intermittent   []Continuous Lbs:  [] before manual  [] after manual  []w/heat    []  Ultrasound: []Continuous   [] Pulsed at:                           []1MHz   []3MHz Location:  W/cm2:    [] Paraffin         Location:   []w/heat   10 [x]  Ice     []  Heat  []  Ice massage Position: supine with LE's elevated  Location:R knee    []  Laser  []  Other: Position:  Location:      []  Vasopneumatic Device Pressure:       [] lo [] med [] hi   Temperature:      [x] Skin assessment post-treatment:  [x]intact []redness- no adverse reaction    []redness  adverse reaction:     45 min Therapeutic Exercise:  [x] See flow sheet :progressed tandem balance to foam, SLS to clocks, TGLP incline to 32 degrees, lateral step ups to 6 inch. Rationale: increase ROM, increase strength, improve coordination, improve balance and increase proprioception to improve the patients ability to able to jeffrey prol standing, prol walking, prol sitting, ascend/descend stairs, perform yard work & exercise w/out pain/limitation     25 min Manual Therapy: R Patella mobs, PROM flexion w/ sup heel slide and at edge of table, tibiofemoral Posterior glides to increase knee flexion in 30-40 degrees knee flexion in HL, PROM extension, gentle posterior femur glides of tibia with towel roll for comfort   Rationale: decrease pain, increase ROM and increase tissue extensibility to improve the patients ability to able to jeffrey prol standing, prol walking, prol sitting, ascend/descend stairs, perform yard work & exercise w/out pain/limitation     NT min Gait Training:  _100__ feet without__ device on level surfaces independently    Stepping drill x 1 minute using mirror for visual feedback cues for DF at heel strike, push off thru 1st MTP   Rationale: increase strength, improve coordination and gait mechanics  to improve the patients ability to able to jeffrey prol standing, prol walking, prol sitting, ascend/descend stairs, perform yard work & exercise w/out pain/limitation           With   - TE   - TA   - neuro   - other: Patient Education: - Review HEP    - Progressed/Changed HEP based on:   - positioning   - body mechanics   - transfers   - heat/ice application    - other:      Other Objective/Functional Measures:   PROM knee flexion in sittin  Degrees  AROM knee flexion in supine: 111 Degrees    Pain Level (0-10 scale) post treatment: 0/10    ASSESSMENT/Changes in Function:   Increased P/AROM in R knee today.  Tolerated progressed exercises well, challenged with SLS clocks and maintaining neutral pelvis. Patient will continue to benefit from skilled PT services to modify and progress therapeutic interventions, address functional mobility deficits, address ROM deficits, address strength deficits, analyze and cue movement patterns and instruct in home and community integration to attain remaining goals.      []  See Plan of Care  []  See progress note/recertification  []  See Discharge Summary         Progress towards goals / Updated goals:  nt    PLAN  []  Upgrade activities as tolerated     [x]  Continue plan of care  []  Update interventions per flow sheet       []  Discharge due to:_  []  Other:_      Ryann Aldridge, PTA 10/16/2017  9:55 AM

## 2017-10-19 ENCOUNTER — HOSPITAL ENCOUNTER (OUTPATIENT)
Dept: PHYSICAL THERAPY | Age: 72
Discharge: HOME | End: 2017-10-19
Payer: MEDICARE

## 2017-10-19 PROCEDURE — 97140 MANUAL THERAPY 1/> REGIONS: CPT | Performed by: PHYSICAL THERAPY ASSISTANT

## 2017-10-19 PROCEDURE — 97110 THERAPEUTIC EXERCISES: CPT | Performed by: PHYSICAL THERAPY ASSISTANT

## 2017-10-19 NOTE — PROGRESS NOTES
PT DAILY TREATMENT NOTE - [facility] 2-15    Patient Name: Jose Otero MD  Date:10/19/2017  : 1945  [x]  Patient  Verified  Payor: Martha Marie / Plan: [place] / Product Type: Medicare /    In time: 8:30 AM  Out time: 9:40 AM  Total Treatment Time (mIn): 70  Total Timed Codes (min): 60  1:1 Treatment Time ( [address] only): 40  Visit #: 9    Treatment Area: Pain in right knee [M25.561]    SUBJECTIVE  Pain Level (0-10 scale): 1/10  Any medication changes, allergies to medications, adverse drug reactions, diagnosis change, or new procedure performed?: [x] No    [] Yes (see summary sheet for update)  Subjective functional status/changes:   [] No changes reported  Patient reports \"doing well today. \" states he will by babysitting his grand kids this weekend.       OBJECTIVE    Modality rationale: decrease edema, decrease inflammation and decrease pain to improve the patients ability to able to jeffrey prol standing, prol walking, prol sitting, ascend/descend stairs, perform yard work & exercise w/out pain/limitation    Min Type Additional Details    [] Estim: []Att   []Unatt        []TENS instruct                  []IFC  []Premod   []NMES                     []Other:  []w/US   []w/ice   []w/heat  Position:  Location:    []  Traction: [] Cervical       []Lumbar                       [] Prone          []Supine                       []Intermittent   []Continuous Lbs:  [] before manual  [] after manual  []w/heat    []  Ultrasound: []Continuous   [] Pulsed at:                           []1MHz   []3MHz Location:  W/cm2:    [] Paraffin         Location:   []w/heat   10 [x]  Ice     []  Heat  []  Ice massage Position: supine with LE's elevated  Location:R knee    []  Laser  []  Other: Position:  Location:      []  Vasopneumatic Device Pressure:       [] lo [] med [] hi   Temperature:      [x] Skin assessment post-treatment:  [x]intact []redness- no adverse reaction    []redness  adverse reaction:     40 min Therapeutic Exercise:  [x] See flow sheet :added rockerboard fw/lat, lateral stepping with yellow TB around ankles   Rationale: increase ROM, increase strength, improve coordination, improve balance and increase proprioception to improve the patients ability to able to jeffrey prol standing, prol walking, prol sitting, ascend/descend stairs, perform yard work & exercise w/out pain/limitation     20 min Manual Therapy: R Patella mobs, PROM flexion w/ sup heel slide and at edge of table, tibiofemoral Posterior glides to increase knee flexion in 30-40 degrees knee flexion in HL, PROM extension, gentle posterior femur glides of tibia with towel roll for comfort   Rationale: decrease pain, increase ROM and increase tissue extensibility to improve the patients ability to able to jeffrey prol standing, prol walking, prol sitting, ascend/descend stairs, perform yard work & exercise w/out pain/limitation     NT min Gait Training:  _100__ feet without__ device on level surfaces independently    Stepping drill x 1 minute using mirror for visual feedback cues for DF at heel strike, push off thru 1st MTP   Rationale: increase strength, improve coordination and gait mechanics  to improve the patients ability to able to jeffrey prol standing, prol walking, prol sitting, ascend/descend stairs, perform yard work & exercise w/out pain/limitation           With   - TE   - TA   - neuro   - other: Patient Education: - Review HEP    - Progressed/Changed HEP based on:   - positioning   - body mechanics   - transfers   - heat/ice application    - other:      Other Objective/Functional Measures:   PROM knee flexion in sittin  Degrees      Pain Level (0-10 scale) post treatment: 0/10    ASSESSMENT/Changes in Function:     ROM steadily improving. Challenged with balance on rockerboard FW >lat, fatigue and muscle soreness after lateral stepping with TB around ankles. Overall tolerated progressed exercises well.      Patient will continue to benefit from skilled PT services to modify and progress therapeutic interventions, address functional mobility deficits, address ROM deficits, address strength deficits, analyze and cue movement patterns and instruct in home and community integration to attain remaining goals.      []  See Plan of Care  []  See progress note/recertification  []  See Discharge Summary         Progress towards goals / Updated goals:  nt    PLAN  []  Upgrade activities as tolerated     [x]  Continue plan of care  []  Update interventions per flow sheet       []  Discharge due to:_  []  Other:_      Mary Ann Brooks PTA 10/19/2017  8:30  AM

## 2017-10-24 ENCOUNTER — HOSPITAL ENCOUNTER (OUTPATIENT)
Dept: PHYSICAL THERAPY | Age: 72
Discharge: HOME | End: 2017-10-24
Payer: MEDICARE

## 2017-10-24 PROCEDURE — G8979 MOBILITY GOAL STATUS: HCPCS

## 2017-10-24 PROCEDURE — 97110 THERAPEUTIC EXERCISES: CPT | Performed by: PHYSICAL THERAPY ASSISTANT

## 2017-10-24 PROCEDURE — 97140 MANUAL THERAPY 1/> REGIONS: CPT | Performed by: PHYSICAL THERAPY ASSISTANT

## 2017-10-24 PROCEDURE — G8978 MOBILITY CURRENT STATUS: HCPCS

## 2017-10-24 NOTE — PROGRESS NOTES
PROGRESS NOTE - [facility] 2-15    Patient Name: Bishnu Patel MD  Date:10/24/2017  : 1945  [x]  Patient  Verified  Payor: Eduarda Fierro / Plan: [place] MEDIC[place] PART A & B / Product Type: Medicare /    In time: 9:55 AM  Out time: 10:25 AM  Total Treatment Time (mIn): 90  Total Timed Codes (min): 80  1:1 Treatment Time ( [address] only): 55  Visit #: 10    Treatment Area: Pain in right knee [M25.561]    SUBJECTIVE  Pain Level (0-10 scale): 0/10  Any medication changes, allergies to medications, adverse drug reactions, diagnosis change, or new procedure performed?: [x] No    [] Yes (see summary sheet for update)  Subjective functional status/changes:   [] No changes reported  States he had a busy weekend, he walked around [facility]. Reports difficulty walking down hills or stepping down stairs due to weakness. Denies pain with these activities.       OBJECTIVE    Modality rationale: decrease edema, decrease inflammation and decrease pain to improve the patients ability to able to jeffrey prol standing, prol walking, prol sitting, ascend/descend stairs, perform yard work & exercise w/out pain/limitation    Min Type Additional Details    [] Estim: []Att   []Unatt        []TENS instruct                  []IFC  []Premod   []NMES                     []Other:  []w/US   []w/ice   []w/heat  Position:  Location:    []  Traction: [] Cervical       []Lumbar                       [] Prone          []Supine                       []Intermittent   []Continuous Lbs:  [] before manual  [] after manual  []w/heat    []  Ultrasound: []Continuous   [] Pulsed at:                           []1MHz   []3MHz Location:  W/cm2:    [] Paraffin         Location:   []w/heat   10 [x]  Ice     []  Heat  []  Ice massage Position: supine with LE's elevated  Location:R knee    []  Laser  []  Other: Position:  Location:      []  Vasopneumatic Device Pressure:       [] lo [] med [] hi   Temperature:      [x] Skin assessment post-treatment: [x]intact []redness- no adverse reaction    []redness  adverse reaction:     60 min Therapeutic Exercise:  [x] See flow sheet :reassessment performed, added supine IT band stretch, eccentric quad heel downs at stairs, unilateral LP at 28 degrees   Rationale: increase ROM, increase strength, improve coordination, improve balance and increase proprioception to improve the patients ability to able to jeffrey prol standing, prol walking, prol sitting, ascend/descend stairs, perform yard work & exercise w/out pain/limitation     20 min Manual Therapy: R Patella mobs, PROM flexion w/ sup heel slide and at edge of table, tibiofemoral Posterior glides to increase knee flexion in 30-40 degrees knee flexion in HL, PROM extension, gentle posterior femur glides of tibia with towel roll for comfort   Rationale: decrease pain, increase ROM and increase tissue extensibility to improve the patients ability to able to jeffrey prol standing, prol walking, prol sitting, ascend/descend stairs, perform yard work & exercise w/out pain/limitation     NT min Gait Training:  _100__ feet without__ device on level surfaces independently    Stepping drill x 1 minute using mirror for visual feedback cues for DF at heel strike, push off thru 1st MTP   Rationale: increase strength, improve coordination and gait mechanics  to improve the patients ability to able to jeffrey prol standing, prol walking, prol sitting, ascend/descend stairs, perform yard work & exercise w/out pain/limitation           With   - TE   - TA   - neuro   - other: Patient Education: - Review HEP    - Progressed/Changed HEP based on:   - positioning   - body mechanics   - transfers   - heat/ice application    - other:      Other Objective/Functional Measures:       Strength:  [] Unable to assess      Right Left   Hip Flexion 5 5     Extension 5- 5     Abduction 4 4+     Adduction nt nt     IR nt nt     ER nt nt   Knee Flexion 4+ 5     Extension 5- 5         SLR R 4/5 L 5/5    QS R fair-good L good      Palpation:   TTP R biceps femoris tendon, distal IT band, Pes Anserine      PROM R Knee: 0-121 degrees    AROM R Knee: 0-113 degrees    FOTO Outcome Measure: 47/100     G-Codes (GP)  Mobility  [id number] Current  CK= 40-59%   Goal  CK= 40-59%      Pain Level (0-10 scale) post treatment: 0/10    ASSESSMENT/Changes in Function:   Patient has been seen for 10 skilled PT visits and demonstrates improved ROM, increased quad/LE strength, improved gait mechanics and overall decreased pain levels since start of PT. He continues to have pain at end range knee flexion and decreased eccentric quad control. Recommend patient continue 2x week for additional 4 weeks and work towards independent gym regimen as well as increasing LE strength and stability with functional tasks. Patient will continue to benefit from skilled PT services to modify and progress therapeutic interventions, address functional mobility deficits, address ROM deficits, address strength deficits, analyze and cue movement patterns and instruct in home and community integration to attain remaining goals. []  See Plan of Care  []  See progress note/recertification  []  See Discharge Summary         Progress towards goals / Updated goals:  Short Term Goals: To be accomplished in 2-3 weeks:  1) Patient will be independent with HEP Met  2) Patient will demonstrate quad set R=L for improved quad control with gait   3) Patient will increase R knee flex AROM >/= 90 degrees to improve sitting tolerance Met     Long Term Goals:  To be accomplished in 6-8 weeks:  1) Patient will increase R knee flex AROM >/= 120 degrees for normal stair ambulation Progressing towards  2) Patient will report ascend/descend stairs without increase knee pain partially met  3) Patient will demonstrate sit to stand transfer without UE assist w/out knee pain Progressing towards  4) Patient will amb >/= 1 mile without increase knee pain Progressing towards  5) Patient will demo independent with modified gym/exercise program without aggravation of symptoms Progressing towards        PLAN  []  Upgrade activities as tolerated     [x]  Continue plan of care  []  Update interventions per flow sheet       []  Discharge due to:_  [x]  Other:continue 2x week for 4 weeks from 10/24/17     Bhavana Mojica, CARLO 10/24/2017  9:55  AM

## 2017-10-27 ENCOUNTER — HOSPITAL ENCOUNTER (OUTPATIENT)
Dept: PHYSICAL THERAPY | Age: 72
Discharge: HOME | End: 2017-10-27
Payer: MEDICARE

## 2017-10-27 PROCEDURE — 97140 MANUAL THERAPY 1/> REGIONS: CPT

## 2017-10-27 PROCEDURE — 97110 THERAPEUTIC EXERCISES: CPT

## 2017-10-27 NOTE — PROGRESS NOTES
PT DAILY TREATMENT NOTE - [facility] 2-15    Patient Name: Tyrell Mae MD  Date:10/27/2017  : 1945  [x]  Patient  Verified  Payor: Ria Funeside / Plan: [place] MEDICARE PART A & B / Product Type: Medicare /    In time: 1000 AM  Out time: 200 AM  Total Treatment Time (mIn): 75  Total Timed Codes (min): 60  1:1 Treatment Time ( only): 40  Visit #: 11    Treatment Area: Pain in right knee [M25.561]    SUBJECTIVE  Pain Level (0-10 scale): 3/10  Any medication changes, allergies to medications, adverse drug reactions, diagnosis change, or new procedure performed?: [x] No    [] Yes (see summary sheet for update)  Subjective functional status/changes:   [] No changes reported  Patient reports incr pain lateral aspect of knee, attributes to new IT band stretch at home      OBJECTIVE    Modality rationale: decrease edema, decrease inflammation and decrease pain to improve the patients ability to able to jeffrey prol standing, prol walking, prol sitting, ascend/descend stairs, perform yard work & exercise w/out pain/limitation    Min Type Additional Details    [] Estim: []Att   []Unatt        []TENS instruct                  []IFC  []Premod   []NMES                     []Other:  []w/US   []w/ice   []w/heat  Position:  Location:    []  Traction: [] Cervical       []Lumbar                       [] Prone          []Supine                       []Intermittent   []Continuous Lbs:  [] before manual  [] after manual  []w/heat    []  Ultrasound: []Continuous   [] Pulsed at:                           []1MHz   []3MHz Location:  W/cm2:    [] Paraffin         Location:   []w/heat   10 [x]  Ice     []  Heat  []  Ice massage Position: supine with LE's elevated  Location:R knee    []  Laser  []  Other: Position:  Location:      []  Vasopneumatic Device Pressure:       [] lo [] med [] hi   Temperature:      [x] Skin assessment post-treatment:  [x]intact []redness- no adverse reaction    []redness  adverse reaction:     40 min Therapeutic Exercise:  [x] See flow sheet : add standing hamstring stretch   Rationale: increase ROM, increase strength, improve coordination, improve balance and increase proprioception to improve the patients ability to able to jeffrey prol standing, prol walking, prol sitting, ascend/descend stairs, perform yard work & exercise w/out pain/limitation     20 min Manual Therapy: R Patella mobs, PROM flexion w/ sup heel slide and at edge of table, tibiofemoral Posterior glides to increase knee flexion in 30-40 degrees knee flexion in HL, PROM extension, gentle posterior femur glides of tibia with towel roll for comfort   Rationale: decrease pain, increase ROM and increase tissue extensibility to improve the patients ability to able to jeffrey prol standing, prol walking, prol sitting, ascend/descend stairs, perform yard work & exercise w/out pain/limitation     NT min Gait Training:  _100__ feet without__ device on level surfaces independently    Stepping drill x 1 minute using mirror for visual feedback cues for DF at heel strike, push off thru 1st MTP   Rationale: increase strength, improve coordination and gait mechanics  to improve the patients ability to able to jeffrey prol standing, prol walking, prol sitting, ascend/descend stairs, perform yard work & exercise w/out pain/limitation           With   - TE   - TA   - neuro   - other: Patient Education: - Review HEP    - Progressed/Changed HEP based on:   - positioning   - body mechanics   - transfers   - heat/ice application    - other:      Other Objective/Functional Measures:   nt      Pain Level (0-10 scale) post treatment: 0/10    ASSESSMENT/Changes in Function:     Cueing for proper technique w/ ITband stretch, pt seemed to be overstretching at home; hold lat step w/ band today due to incr lat knee pain after last visit although probable that it was aggravated by overstretching w/ strap stretch     Patient will continue to benefit from skilled PT services to modify and progress therapeutic interventions, address functional mobility deficits, address ROM deficits, address strength deficits, analyze and cue movement patterns and instruct in home and community integration to attain remaining goals.      []  See Plan of Care  []  See progress note/recertification  []  See Discharge Summary         Progress towards goals / Updated goals:  nt    PLAN  []  Upgrade activities as tolerated     [x]  Continue plan of care  []  Update interventions per flow sheet       []  Discharge due to:_  []  Other:_      Tonya Morel PT 10/27/2017  10:30  AM

## 2017-10-30 ENCOUNTER — HOSPITAL ENCOUNTER (OUTPATIENT)
Dept: PHYSICAL THERAPY | Age: 72
Discharge: HOME | End: 2017-10-30
Payer: MEDICARE

## 2017-10-30 PROCEDURE — 97140 MANUAL THERAPY 1/> REGIONS: CPT

## 2017-10-30 PROCEDURE — 97110 THERAPEUTIC EXERCISES: CPT

## 2017-10-30 NOTE — PROGRESS NOTES
PT DAILY TREATMENT NOTE - [facility]    Patient Name: Jonathon Escalante MD  Date:10/30/2017  : 1945  [x]  Patient  Verified  Payor: Jil Green / Plan: [place] MEDICARE PART A & B / Product Type: Medicare /    In time: 1000 AM  Out time: 12 AM  Total Treatment Time (mIn): 65  Total Timed Codes (min): 55  1:1 Treatment Time ( only): 40  Visit #: 12    Treatment Area: Pain in right knee [M25.561]    SUBJECTIVE  Pain Level (0-10 scale): 0/10  Any medication changes, allergies to medications, adverse drug reactions, diagnosis change, or new procedure performed?: [x] No    [] Yes (see summary sheet for update)  Subjective functional status/changes:   [] No changes reported  Patient reports cont to have soreness w/ pressure R knee      OBJECTIVE    Modality rationale: decrease edema, decrease inflammation and decrease pain to improve the patients ability to able to jeffrey prol standing, prol walking, prol sitting, ascend/descend stairs, perform yard work & exercise w/out pain/limitation    Min Type Additional Details    [] Estim: []Att   []Unatt        []TENS instruct                  []IFC  []Premod   []NMES                     []Other:  []w/US   []w/ice   []w/heat  Position:  Location:    []  Traction: [] Cervical       []Lumbar                       [] Prone          []Supine                       []Intermittent   []Continuous Lbs:  [] before manual  [] after manual  []w/heat    []  Ultrasound: []Continuous   [] Pulsed at:                           []1MHz   []3MHz Location:  W/cm2:    [] Paraffin         Location:   []w/heat   10 [x]  Ice     []  Heat  []  Ice massage Position: supine with LE's elevated  Location:R knee    []  Laser  []  Other: Position:  Location:      []  Vasopneumatic Device Pressure:       [] lo [] med [] hi   Temperature:      [x] Skin assessment post-treatment:  [x]intact []redness- no adverse reaction    []redness  adverse reaction:     40 min Therapeutic Exercise:  [x] See flow sheet : add knee flex stretch at stairs   Rationale: increase ROM, increase strength, improve coordination, improve balance and increase proprioception to improve the patients ability to able to jeffrey prol standing, prol walking, prol sitting, ascend/descend stairs, perform yard work & exercise w/out pain/limitation     15 min Manual Therapy: R Patella mobs, PROM flexion w/ sup heel slide and at edge of table, tibiofemoral Posterior glides to increase knee flexion in 30-40 degrees knee flexion in HL, PROM extension, gentle posterior femur glides of tibia with towel roll for comfort   Rationale: decrease pain, increase ROM and increase tissue extensibility to improve the patients ability to able to jeffrey prol standing, prol walking, prol sitting, ascend/descend stairs, perform yard work & exercise w/out pain/limitation     NT min Gait Training:  _100__ feet without__ device on level surfaces independently    Stepping drill x 1 minute using mirror for visual feedback cues for DF at heel strike, push off thru 1st MTP   Rationale: increase strength, improve coordination and gait mechanics  to improve the patients ability to able to jeffrey prol standing, prol walking, prol sitting, ascend/descend stairs, perform yard work & exercise w/out pain/limitation           With   - TE   - TA   - neuro   - other: Patient Education: - Review HEP    - Progressed/Changed HEP based on:   - positioning   - body mechanics   - transfers   - heat/ice application    - other:      Other Objective/Functional Measures:   nt      Pain Level (0-10 scale) post treatment: 0/10    ASSESSMENT/Changes in Function:     Greatest limitation cont to be knee flex, limited by pain/stiffness     Patient will continue to benefit from skilled PT services to modify and progress therapeutic interventions, address functional mobility deficits, address ROM deficits, address strength deficits, analyze and cue movement patterns and instruct in home and community integration to attain remaining goals.      []  See Plan of Care  []  See progress note/recertification  []  See Discharge Summary         Progress towards goals / Updated goals:  nt    PLAN  []  Upgrade activities as tolerated     [x]  Continue plan of care  []  Update interventions per flow sheet       []  Discharge due to:_  []  Other:_      Onel Martinez, PT 10/30/2017  10:40  AM

## 2017-11-02 ENCOUNTER — HOSPITAL ENCOUNTER (OUTPATIENT)
Dept: PHYSICAL THERAPY | Age: 72
Discharge: HOME | End: 2017-11-02
Payer: MEDICARE

## 2017-11-02 PROCEDURE — 97110 THERAPEUTIC EXERCISES: CPT

## 2017-11-02 PROCEDURE — 97140 MANUAL THERAPY 1/> REGIONS: CPT

## 2017-11-02 NOTE — PROGRESS NOTES
PT DAILY TREATMENT NOTE - [facility] 2-15    Patient Name: Jose Velazquez MD  Date:2017  : 1945  [x]  Patient  Verified  Payor: [place] / Plan: [place] MED[place] & B / Product Type: Medicare /    In time: 950 AM  Out time: 0 AM  Total Treatment Time (mIn): 80  Total Timed Codes (min): 70  1:1 Treatment Time ( only): 40  Visit #: 13    Treatment Area: Pain in right knee [M25.561]    SUBJECTIVE  Pain Level (0-10 scale): 0/10   Any medication changes, allergies to medications, adverse drug reactions, diagnosis change, or new procedure performed?: [x] No    [] Yes (see summary sheet for update)  Subjective functional status/changes:   [] No changes reported  Patient reports been working on knee flex stretch on stairs at home      OBJECTIVE    Modality rationale: decrease edema, decrease inflammation and decrease pain to improve the patients ability to able to jeffrey prol standing, prol walking, prol sitting, ascend/descend stairs, perform yard work & exercise w/out pain/limitation    Min Type Additional Details    [] Estim: []Att   []Unatt        []TENS instruct                  []IFC  []Premod   []NMES                     []Other:  []w/US   []w/ice   []w/heat  Position:  Location:    []  Traction: [] Cervical       []Lumbar                       [] Prone          []Supine                       []Intermittent   []Continuous Lbs:  [] before manual  [] after manual  []w/heat    []  Ultrasound: []Continuous   [] Pulsed at:                           []1MHz   []3MHz Location:  W/cm2:    [] Paraffin         Location:   []w/heat   10 [x]  Ice     []  Heat  []  Ice massage Position: supine with LE's elevated  Location:R knee    []  Laser  []  Other: Position:  Location:      []  Vasopneumatic Device Pressure:       [] lo [] med [] hi   Temperature:      [x] Skin assessment post-treatment:  [x]intact []redness- no adverse reaction    []redness  adverse reaction:     50 min Therapeutic Exercise: [x] See flow sheet : add CKC TKE, resume lat step w/ band     Rationale: increase ROM, increase strength, improve coordination, improve balance and increase proprioception to improve the patients ability to able to jeffrey prol standing, prol walking, prol sitting, ascend/descend stairs, perform yard work & exercise w/out pain/limitation     20 min Manual Therapy: R Patella mobs, PROM flexion w/ sup heel slide and at edge of table, tibiofemoral Posterior glides to increase knee flexion in 30-40 degrees knee flexion in HL & at edge of table, PROM extension   Rationale: decrease pain, increase ROM and increase tissue extensibility to improve the patients ability to able to jeffrey prol standing, prol walking, prol sitting, ascend/descend stairs, perform yard work & exercise w/out pain/limitation     NT min Gait Training:  _100__ feet without__ device on level surfaces independently    Stepping drill x 1 minute using mirror for visual feedback cues for DF at heel strike, push off thru 1st MTP   Rationale: increase strength, improve coordination and gait mechanics  to improve the patients ability to able to jeffrey prol standing, prol walking, prol sitting, ascend/descend stairs, perform yard work & exercise w/out pain/limitation           With   - TE   - TA   - neuro   - other: Patient Education: - Review HEP    - Progressed/Changed HEP based on:   - positioning   - body mechanics   - transfers   - heat/ice application    - other:      Other Objective/Functional Measures:   Knee flex 126 degrees    Pain Level (0-10 scale) post treatment: 0/10    ASSESSMENT/Changes in Function:     Able to bend R knee to 126 degrees flex today after time spent w/ STM/TRP R distal quad, joint mobilization and PROM    Patient will continue to benefit from skilled PT services to modify and progress therapeutic interventions, address functional mobility deficits, address ROM deficits, address strength deficits, analyze and cue movement patterns and instruct in home and community integration to attain remaining goals.      []  See Plan of Care  []  See progress note/recertification  []  See Discharge Summary         Progress towards goals / Updated goals:  nt    PLAN  []  Upgrade activities as tolerated     [x]  Continue plan of care  []  Update interventions per flow sheet       []  Discharge due to:_  []  Other:_      Elo Stephen, PT 11/2/2017  11:17  AM

## 2017-11-06 ENCOUNTER — HOSPITAL ENCOUNTER (OUTPATIENT)
Dept: PHYSICAL THERAPY | Age: 72
Discharge: HOME | End: 2017-11-06
Payer: MEDICARE

## 2017-11-06 PROCEDURE — 97110 THERAPEUTIC EXERCISES: CPT

## 2017-11-06 PROCEDURE — 97140 MANUAL THERAPY 1/> REGIONS: CPT

## 2017-11-06 NOTE — PROGRESS NOTES
PT DAILY TREATMENT NOTE - [facility] 2-15    Patient Name: Tyrell Mae MD  Date:2017  : 1945  [x]  Patient  Verified  Payor: Ria Amena / Plan: [place] MEDICARE PART A & B / Product Type: Medicare /    In time: 5446 PM  Out time: 140 PM  Total Treatment Time (mIn): 75  Total Timed Codes (min): 60  1:1 Treatment Time ( only): 54  Visit #: 14    Treatment Area: Pain in right knee [M25.561]    SUBJECTIVE  Pain Level (0-10 scale): 0/10   Any medication changes, allergies to medications, adverse drug reactions, diagnosis change, or new procedure performed?: [x] No    [] Yes (see summary sheet for update)  Subjective functional status/changes:   [] No changes reported  Patient reports \"stiff\"      OBJECTIVE    Modality rationale: decrease edema, decrease inflammation and decrease pain to improve the patients ability to able to jeffrey prol standing, prol walking, prol sitting, ascend/descend stairs, perform yard work & exercise w/out pain/limitation    Min Type Additional Details    [] Estim: []Att   []Unatt        []TENS instruct                  []IFC  []Premod   []NMES                     []Other:  []w/US   []w/ice   []w/heat  Position:  Location:    []  Traction: [] Cervical       []Lumbar                       [] Prone          []Supine                       []Intermittent   []Continuous Lbs:  [] before manual  [] after manual  []w/heat    []  Ultrasound: []Continuous   [] Pulsed at:                           []1MHz   []3MHz Location:  W/cm2:    [] Paraffin         Location:   []w/heat   10 [x]  Ice     []  Heat  []  Ice massage Position: supine with LE's elevated  Location:R knee    []  Laser  []  Other: Position:  Location:      []  Vasopneumatic Device Pressure:       [] lo [] med [] hi   Temperature:      [x] Skin assessment post-treatment:  [x]intact []redness- no adverse reaction    []redness  adverse reaction:     40 min Therapeutic Exercise:  [x] See flow sheet : add HC mach    Rationale: increase ROM, increase strength, improve coordination, improve balance and increase proprioception to improve the patients ability to able to jeffrey prol standing, prol walking, prol sitting, ascend/descend stairs, perform yard work & exercise w/out pain/limitation     20 min Manual Therapy: R Patella mobs, PROM flexion w/ sup heel slide and at edge of table, tibiofemoral Posterior glides to increase knee flexion in 30-40 degrees knee flexion in HL & at edge of table, PROM extension   Rationale: decrease pain, increase ROM and increase tissue extensibility to improve the patients ability to able to jeffrey prol standing, prol walking, prol sitting, ascend/descend stairs, perform yard work & exercise w/out pain/limitation     NT min Gait Training:  _100__ feet without__ device on level surfaces independently    Stepping drill x 1 minute using mirror for visual feedback cues for DF at heel strike, push off thru 1st MTP   Rationale: increase strength, improve coordination and gait mechanics  to improve the patients ability to able to jeffrey prol standing, prol walking, prol sitting, ascend/descend stairs, perform yard work & exercise w/out pain/limitation           With   - TE   - TA   - neuro   - other: Patient Education: - Review HEP    - Progressed/Changed HEP based on:   - positioning   - body mechanics   - transfers   - heat/ice application    - other:      Other Objective/Functional Measures:   nt    Pain Level (0-10 scale) post treatment: 0/10    ASSESSMENT/Changes in Function:     Fatigue w/ lat step w/ band    Patient will continue to benefit from skilled PT services to modify and progress therapeutic interventions, address functional mobility deficits, address ROM deficits, address strength deficits, analyze and cue movement patterns and instruct in home and community integration to attain remaining goals.      []  See Plan of Care  []  See progress note/recertification  []  See Discharge Summary         Progress towards goals / Updated goals:  nt    PLAN  []  Upgrade activities as tolerated     [x]  Continue plan of care  []  Update interventions per flow sheet       []  Discharge due to:_  []  Other:_      Elo Stephen PT 11/6/2017  12:28 PM

## 2017-11-09 ENCOUNTER — HOSPITAL ENCOUNTER (OUTPATIENT)
Dept: PHYSICAL THERAPY | Age: 72
Discharge: HOME | End: 2017-11-09
Payer: MEDICARE

## 2017-11-09 PROCEDURE — 97110 THERAPEUTIC EXERCISES: CPT

## 2017-11-09 PROCEDURE — 97140 MANUAL THERAPY 1/> REGIONS: CPT

## 2017-11-09 NOTE — PROGRESS NOTES
PT DAILY TREATMENT NOTE - [facility] 2-15    Patient Name: Rodrick Agustin MD  Date:2017  : 1945  [x]  Patient  Verified  Payor: [employer] / Plan: [place] MEDICARE PART A & B / Product Type: Medicare /    In time: 56 AM  Out time: 9456 AM  Total Treatment Time (mIn): 65  Total Timed Codes (min): 55  1:1 Treatment Time [facility] only):45   Visit #: 15    Treatment Area: Pain in right knee [M25.561]    SUBJECTIVE  Pain Level (0-10 scale): 0/10   Any medication changes, allergies to medications, adverse drug reactions, diagnosis change, or new procedure performed?: [x] No    [] Yes (see summary sheet for update)  Subjective functional status/changes:   [] No changes reported  Patient reports \"stiff\" w/ getting in the car      OBJECTIVE    Modality rationale: decrease edema, decrease inflammation and decrease pain to improve the patients ability to able to jeffrey prol standing, prol walking, prol sitting, ascend/descend stairs, perform yard work & exercise w/out pain/limitation    Min Type Additional Details    [] Estim: []Att   []Unatt        []TENS instruct                  []IFC  []Premod   []NMES                     []Other:  []w/US   []w/ice   []w/heat  Position:  Location:    []  Traction: [] Cervical       []Lumbar                       [] Prone          []Supine                       []Intermittent   []Continuous Lbs:  [] before manual  [] after manual  []w/heat    []  Ultrasound: []Continuous   [] Pulsed at:                           []1MHz   []3MHz Location:  W/cm2:    [] Paraffin         Location:   []w/heat   10 [x]  Ice     []  Heat  []  Ice massage Position: supine with LE's elevated  Location:R knee    []  Laser  []  Other: Position:  Location:      []  Vasopneumatic Device Pressure:       [] lo [] med [] hi   Temperature:      [x] Skin assessment post-treatment:  [x]intact []redness- no adverse reaction    []redness  adverse reaction:     35 min Therapeutic Exercise:  [x] See flow sheet : incr resistance/reps as per chart   Rationale: increase ROM, increase strength, improve coordination, improve balance and increase proprioception to improve the patients ability to able to jeffrey prol standing, prol walking, prol sitting, ascend/descend stairs, perform yard work & exercise w/out pain/limitation     20 min Manual Therapy: R Patella mobs, PROM flexion w/ sup heel slide and at edge of table, tibiofemoral Posterior glides to increase knee flexion in 30-40 degrees knee flexion in HL & at edge of table, PROM extension   Rationale: decrease pain, increase ROM and increase tissue extensibility to improve the patients ability to able to jeffrey prol standing, prol walking, prol sitting, ascend/descend stairs, perform yard work & exercise w/out pain/limitation     NT min Gait Training:  _100__ feet without__ device on level surfaces independently    Stepping drill x 1 minute using mirror for visual feedback cues for DF at heel strike, push off thru 1st MTP   Rationale: increase strength, improve coordination and gait mechanics  to improve the patients ability to able to jeffrey prol standing, prol walking, prol sitting, ascend/descend stairs, perform yard work & exercise w/out pain/limitation           With   - TE   - TA   - neuro   - other: Patient Education: - Review HEP    - Progressed/Changed HEP based on:   - positioning   - body mechanics   - transfers   - heat/ice application    - other:      Other Objective/Functional Measures:   Knee flex PROM 123    Pain Level (0-10 scale) post treatment: 0/10    ASSESSMENT/Changes in Function:     Knee flex PROM cont to be limited by pain/stiffness; demonstrates good quad control w/ heel down     Patient will continue to benefit from skilled PT services to modify and progress therapeutic interventions, address functional mobility deficits, address ROM deficits, address strength deficits, analyze and cue movement patterns and instruct in home and community integration to attain remaining goals.      []  See Plan of Care  []  See progress note/recertification  []  See Discharge Summary         Progress towards goals / Updated goals:  nt    PLAN  []  Upgrade activities as tolerated     [x]  Continue plan of care  []  Update interventions per flow sheet       []  Discharge due to:_  []  Other:_      Benito Dyer, PT 11/9/2017  11:06 AM

## 2017-11-13 ENCOUNTER — HOSPITAL ENCOUNTER (OUTPATIENT)
Dept: PHYSICAL THERAPY | Age: 72
Discharge: HOME | End: 2017-11-13
Payer: MEDICARE

## 2017-11-13 PROCEDURE — 97110 THERAPEUTIC EXERCISES: CPT

## 2017-11-13 PROCEDURE — 97140 MANUAL THERAPY 1/> REGIONS: CPT

## 2017-11-17 ENCOUNTER — HOSPITAL ENCOUNTER (OUTPATIENT)
Dept: PHYSICAL THERAPY | Age: 72
Discharge: HOME | End: 2017-11-17
Payer: MEDICARE

## 2017-11-17 PROCEDURE — 97110 THERAPEUTIC EXERCISES: CPT | Performed by: PHYSICAL THERAPY ASSISTANT

## 2017-11-17 PROCEDURE — 97140 MANUAL THERAPY 1/> REGIONS: CPT | Performed by: PHYSICAL THERAPY ASSISTANT

## 2017-11-17 NOTE — PROGRESS NOTES
PT DAILY TREATMENT NOTE - [facility] 2-15    Patient Name: Amy Smalls MD  Date:2017  : 1945  [x]  Patient  Verified  Payor: Magan Chirinos / Plan: [place] MEDICARE PART A & B / Product Type: Medicare /    In time: 56 AM  Out time: 36 AM  Total Treatment Time (mIn): 60  Total Timed Codes (min): 55  1:1 Treatment Time ( [address] only): 30  Visit #: 17    Treatment Area: Pain in right knee [M25.561]    SUBJECTIVE  Pain Level (0-10 scale): 0/10   Any medication changes, allergies to medications, adverse drug reactions, diagnosis change, or new procedure performed?: [x] No    [] Yes (see summary sheet for update)  Subjective functional status/changes:   [] No changes reported  Patient states he has clicking in his R knee when he sways side to side but denies any pain. \"I feel like I'm about done (in regards to PT) my knee doesn't bother me. \"     OBJECTIVE    Modality rationale: decrease edema, decrease inflammation and decrease pain to improve the patients ability to able to jeffrey prol standing, prol walking, prol sitting, ascend/descend stairs, perform yard work & exercise w/out pain/limitation    Min Type Additional Details    [] Estim: []Att   []Unatt        []TENS instruct                  []IFC  []Premod   []NMES                     []Other:  []w/US   []w/ice   []w/heat  Position:  Location:    []  Traction: [] Cervical       []Lumbar                       [] Prone          []Supine                       []Intermittent   []Continuous Lbs:  [] before manual  [] after manual  []w/heat    []  Ultrasound: []Continuous   [] Pulsed at:                           []1MHz   []3MHz Location:  W/cm2:    [] Paraffin         Location:   []w/heat   Pt declined [x]  Ice     []  Heat  []  Ice massage Position: supine with LE's elevated  Location:R knee    []  Laser  []  Other: Position:  Location:      []  Vasopneumatic Device Pressure:       [] lo [] med [] hi   Temperature:      [x] Skin assessment post-treatment: [x]intact []redness- no adverse reaction    []redness  adverse reaction:     40 min Therapeutic Exercise:  [x] See flow sheet : incr resistance/reps as per chart   Rationale: increase ROM, increase strength, improve coordination, improve balance and increase proprioception to improve the patients ability to able to jeffrey prol standing, prol walking, prol sitting, ascend/descend stairs, perform yard work & exercise w/out pain/limitation     20 min Manual Therapy: R Patella mobs, PROM flexion w/ sup heel slide and at edge of table, tibiofemoral Posterior glides to increase knee flexion in 30-40 degrees knee flexion in HL & at edge of table, PROM extension   Rationale: decrease pain, increase ROM and increase tissue extensibility to improve the patients ability to able to jeffrey prol standing, prol walking, prol sitting, ascend/descend stairs, perform yard work & exercise w/out pain/limitation     NT min Gait Training:  _100__ feet without__ device on level surfaces independently    Stepping drill x 1 minute using mirror for visual feedback cues for DF at heel strike, push off thru 1st MTP   Rationale: increase strength, improve coordination and gait mechanics  to improve the patients ability to able to jeffrey prol standing, prol walking, prol sitting, ascend/descend stairs, perform yard work & exercise w/out pain/limitation           With   - TE   - TA   - neuro   - other: Patient Education: - Review HEP    - Progressed/Changed HEP based on:   - positioning   - body mechanics   - transfers   - heat/ice application    - other:      Other Objective/Functional Measures:   PROM R knee flexion: 126 degrees  AROM R knee flexion: 120 degrees    Pain Level (0-10 scale) post treatment: 0/10    ASSESSMENT/Changes in Function:     Increased knee flexion today. Reviewed gym equipment for d/c in 2 weeks.      Patient will continue to benefit from skilled PT services to modify and progress therapeutic interventions, address functional mobility deficits, address ROM deficits, address strength deficits, analyze and cue movement patterns and instruct in home and community integration to attain remaining goals.      []  See Plan of Care  []  See progress note/recertification  []  See Discharge Summary         Progress towards goals / Updated goals:  nt    PLAN  []  Upgrade activities as tolerated     [x]  Continue plan of care  []  Update interventions per flow sheet       []  Discharge due to:_  []  Other:_      Doneeder Kiser, PTA 11/17/2017  10:30 AM

## 2017-11-20 ENCOUNTER — HOSPITAL ENCOUNTER (OUTPATIENT)
Dept: PHYSICAL THERAPY | Age: 72
Discharge: HOME | End: 2017-11-20
Payer: MEDICARE

## 2017-11-20 PROCEDURE — 97140 MANUAL THERAPY 1/> REGIONS: CPT | Performed by: PHYSICAL THERAPY ASSISTANT

## 2017-11-20 PROCEDURE — 97110 THERAPEUTIC EXERCISES: CPT | Performed by: PHYSICAL THERAPY ASSISTANT

## 2017-11-20 NOTE — PROGRESS NOTES
sheet : incr resistance/reps as per chart   Rationale: increase ROM, increase strength, improve coordination, improve balance and increase proprioception to improve the patients ability to able to jeffrey prol standing, prol walking, prol sitting, ascend/descend stairs, perform yard work & exercise w/out pain/limitation     20 min Manual Therapy: R Patella mobs, PROM flexion w/ sup heel slide and at edge of table, tibiofemoral Posterior glides to increase knee flexion in 30-40 degrees knee flexion in HL & at edge of table, PROM extension   Rationale: decrease pain, increase ROM and increase tissue extensibility to improve the patients ability to able to jeffrey prol standing, prol walking, prol sitting, ascend/descend stairs, perform yard work & exercise w/out pain/limitation     NT min Gait Training:  _100__ feet without__ device on level surfaces independently    Stepping drill x 1 minute using mirror for visual feedback cues for DF at heel strike, push off thru 1st MTP   Rationale: increase strength, improve coordination and gait mechanics  to improve the patients ability to able to jeffrey prol standing, prol walking, prol sitting, ascend/descend stairs, perform yard work & exercise w/out pain/limitation           With   - TE   - TA   - neuro   - other: Patient Education: - Review HEP    - Progressed/Changed HEP based on:   - positioning   - body mechanics   - transfers   - heat/ice application    - other:      Other Objective/Functional Measures:   PROM L knee flexion 126 at end of table    Pain Level (0-10 scale) post treatment: 0/10    ASSESSMENT/Changes in Function:   Progressing well. Continues to have pain and limitation at end range knee flexion. Able to achieve 126 degrees passively today.       Patient will continue to benefit from skilled PT services to modify and progress therapeutic interventions, address functional mobility deficits, address ROM deficits, address strength deficits, analyze and cue movement patterns and instruct in home and community integration to attain remaining goals.      []  See Plan of Care  []  See progress note/recertification  []  See Discharge Summary         Progress towards goals / Updated goals:  nt    PLAN  []  Upgrade activities as tolerated     [x]  Continue plan of care  []  Update interventions per flow sheet       []  Discharge due to:_  []  Other:_      Sarah Cummins, CARLO 11/20/2017  11:30 AM

## 2017-11-27 ENCOUNTER — HOSPITAL ENCOUNTER (OUTPATIENT)
Dept: PHYSICAL THERAPY | Age: 72
Discharge: HOME | End: 2017-11-27
Payer: MEDICARE

## 2017-11-27 PROCEDURE — G8979 MOBILITY GOAL STATUS: HCPCS

## 2017-11-27 PROCEDURE — 97140 MANUAL THERAPY 1/> REGIONS: CPT

## 2017-11-27 PROCEDURE — G8980 MOBILITY D/C STATUS: HCPCS

## 2017-11-27 PROCEDURE — 97110 THERAPEUTIC EXERCISES: CPT

## 2017-11-27 NOTE — PROGRESS NOTES
PT DAILY TREATMENT NOTE/DISCHARGE SUMMARY - [facility] 2-15    Patient Name: Du Nolen MD  Date:2017  : 1945  [x]  Patient  Verified  Payor: [employer] / Plan: [place] / Product Type: Medicare /    In time: 1130 AM  Out time: 200 PM  Total Treatment Time (mIn): 70  Total Timed Codes (min): 55  1:1 Treatment Time (MC only): 40  Visit #: 19    Treatment Area: Pain in right knee [M25.561]    SUBJECTIVE  Pain Level (0-10 scale): 0/10   Any medication changes, allergies to medications, adverse drug reactions, diagnosis change, or new procedure performed?: [x] No    [] Yes (see summary sheet for update)  Subjective functional status/changes:   [] No changes reported    Pt reports min report of R knee pain,  \"think the left knee is giving me more trouble than the right\", w/ min report of functional limitation     OBJECTIVE    Modality rationale: decrease edema, decrease inflammation and decrease pain to improve the patients ability to able to jeffrey prol standing, prol walking, prol sitting, ascend/descend stairs, perform yard work & exercise w/out pain/limitation    Min Type Additional Details    [] Estim: []Att   []Unatt        []TENS instruct                  []IFC  []Premod   []NMES                     []Other:  []w/US   []w/ice   []w/heat  Position:  Location:    []  Traction: [] Cervical       []Lumbar                       [] Prone          []Supine                       []Intermittent   []Continuous Lbs:  [] before manual  [] after manual  []w/heat    []  Ultrasound: []Continuous   [] Pulsed at:                           []1MHz   []3MHz Location:  W/cm2:    [] Paraffin         Location:   []w/heat   10 [x]  Ice     []  Heat  []  Ice massage Position: supine with LE's elevated  Location:R knee    []  Laser  []  Other: Position:  Location:      []  Vasopneumatic Device Pressure:       [] lo [] med [] hi   Temperature:      [x] Skin assessment post-treatment:  [x]intact []redness- no adverse reaction    []redness  adverse reaction:     35 min Therapeutic Exercise:  [x] See flow sheet : brief reassessment performed, review HEP recommendations    Rationale: increase ROM, increase strength, improve coordination, improve balance and increase proprioception to improve the patients ability to able to jeffrey prol standing, prol walking, prol sitting, ascend/descend stairs, perform yard work & exercise w/out pain/limitation     20 min Manual Therapy: R Patella mobs, PROM flexion w/ sup heel slide and at edge of table, tibiofemoral Posterior glides to increase knee flexion in 30-40 degrees knee flexion in HL & at edge of table, PROM extension   Rationale: decrease pain, increase ROM and increase tissue extensibility to improve the patients ability to able to jeffrey prol standing, prol walking, prol sitting, ascend/descend stairs, perform yard work & exercise w/out pain/limitation           With   - TE   - TA   - neuro   - other: Patient Education: - Review HEP/independent program     - Progressed/Changed HEP based on:   - positioning   - body mechanics   - transfers   - heat/ice application    - other:      Other Objective/Functional Measures:     ROM  R knee AAROM 0-125    Strength  MMT R LE  5/5  QS R = L   SLR R 5/5    Gait   W/out abnormality    Mobility  Patellar mobility R good     FOTO score 72/100     G-Codes (GP)  Mobility    Goal  CK= 40-59%  D/C  CJ= 20-39%    Pain Level (0-10 scale) post treatment: 0/10    ASSESSMENT/Changes in Function:     Patient has been seen for 19 skilled PT visits; at this time he demonstrates signif improvement w/ R knee ROM, increased stregth = L, normalization of gait and demonstrates and reports min functional limitations; pt has incr FOTO score from 31 to 72 placing patient in stage 6 and means patient is an active community ambulator     []  See Plan of Care  []  See progress note/recertification  [x]  See Discharge Summary         Progress towards goals / Updated goals:  Progress towards goals / Updated goals:  Short Term Goals: To be accomplished in 2-3 weeks:  1) Patient will be independent with HEP Met  2) Patient will demonstrate quad set R=L for improved quad control with gait Met  3) Patient will increase R knee flex AROM >/= 90 degrees to improve sitting tolerance Met      Long Term Goals: To be accomplished in 6-8 weeks:  1) Patient will increase R knee flex AROM >/= 120 degrees for normal stair ambulation Met  2) Patient will report ascend/descend stairs without increase knee pain  met  3) Patient will demonstrate sit to stand transfer without UE assist w/out knee pain Met  4) Patient will amb >/= 1 mile without increase knee pain Progressing towards  5) Patient will demo independent with modified gym/exercise program without aggravation of symptoms Met     PLAN  []  Upgrade activities as tolerated     [x]  Continue plan of care  []  Update interventions per flow sheet       [x]  Discharge due to: working towards or has met all goals   []  Other:Chandni Thompson, PT 11/27/2017  11:32 AM

## 2017-11-28 NOTE — ANCILLARY DISCHARGE INSTRUCTIONS
DISCHARGE SUMMARY - [facility] 2-15    Patient Name: Edi Cornelius MD  Date:2017  : 1945  Visits: 19    Treatment Area: Pain in right knee [M25.561]    SUBJECTIVE    Pt reports min report of R knee pain,  \"think the left knee is giving me more trouble than the right\", w/ min report of functional limitation     OBJECTIVE    ROM  R knee AAROM 0-125    Strength  MMT R LE  5/5  QS R = L   SLR R 5/5    Gait   W/out abnormality    Mobility  Patellar mobility R good     FOTO score 72/100     G-Codes (GP)  Mobility    Goal  CK= 40-59%  D/C  CJ= 20-39%    Pain Level (0-10 scale) post treatment: 0/10    ASSESSMENT/Changes in Function:     Patient has been seen for 19 skilled PT visits; at this time he demonstrates signif improvement w/ R knee ROM, increased stregth = L, normalization of gait and demonstrates and reports min functional limitations; pt has incr FOTO score from 31 to 72 placing patient in stage 6 and means patient is an active community ambulator         Progress towards goals / Updated goals:  Short Term Goals: To be accomplished in 2-3 weeks:  1) Patient will be independent with HEP Met  2) Patient will demonstrate quad set R=L for improved quad control with gait Met  3) Patient will increase R knee flex AROM >/= 90 degrees to improve sitting tolerance Met      Long Term Goals: To be accomplished in 6-8 weeks:  1) Patient will increase R knee flex AROM >/= 120 degrees for normal stair ambulation Met  2) Patient will report ascend/descend stairs without increase knee pain  met  3) Patient will demonstrate sit to stand transfer without UE assist w/out knee pain Met  4) Patient will amb >/= 1 mile without increase knee pain Progressing towards  5) Patient will demo independent with modified gym/exercise program without aggravation of symptoms Met     PLAN      [x]  Discharge due to: working towards or has met all goals       Gabriela Burton PT 2017  11:32 AM

## (undated) DEVICE — PREP SKN PREVAIL 40ML APPL --

## (undated) DEVICE — LIGHT HANDLE: Brand: DEVON

## (undated) DEVICE — SUTURE MCRYL SZ 3-0 L27IN ABSRB UD L24MM PS-1 3/8 CIR PRIM Y936H

## (undated) DEVICE — CUSTOM CAST PD STR

## (undated) DEVICE — DEVON™ KNEE AND BODY STRAP 60" X 3" (1.5 M X 7.6 CM): Brand: DEVON

## (undated) DEVICE — 3M™ IOBAN™ 2 ANTIMICROBIAL INCISE DRAPE 6651EZ: Brand: IOBAN™ 2

## (undated) DEVICE — STRYKER PERFORMANCE SERIES SAGITTAL BLADE: Brand: STRYKER PERFORMANCE SERIES

## (undated) DEVICE — GOWN,PREVENTION PLUS,XLN/2XL,ST,22/CS: Brand: MEDLINE

## (undated) DEVICE — INFECTION CONTROL KIT SYS

## (undated) DEVICE — STERILE POLYISOPRENE POWDER-FREE SURGICAL GLOVES: Brand: PROTEXIS

## (undated) DEVICE — Z DISCONTINUED USE 2744636  DRESSING AQUACEL 14 IN ALG W3.5XL14IN POLYUR FLM CVR W/ HYDRCOLL

## (undated) DEVICE — SOLUTION IV 50ML 0.9% SOD CHL

## (undated) DEVICE — HOOK LOCK LATEX FREE ELASTIC BANDAGE D/L 6INX10YD

## (undated) DEVICE — SCRUB DRY SURG EZ SCRUB BRUSH PREOPERATIVE GRN

## (undated) DEVICE — CARTRIDGE BNE CEM MIX UNIV TWR VAC ROTOR BRK OFF NOZ W/O

## (undated) DEVICE — (D)SYR 10ML 1/5ML GRAD NSAF -- PKGING CHANGE USE ITEM 338027

## (undated) DEVICE — DERMABOND SKIN ADH 0.7ML -- DERMABOND ADVANCED 12/BX

## (undated) DEVICE — DRAPE,EXTREMITY,89X128,STERILE: Brand: MEDLINE

## (undated) DEVICE — GAUZE SPONGES,12 PLY: Brand: CURITY

## (undated) DEVICE — SUTURE VCRL SZ 2-0 L36IN ABSRB UD L40MM CT 1/2 CIR J957H

## (undated) DEVICE — SUTURE VCRL 1 L27IN ABSRB CT BRAID COAT UD J281H

## (undated) DEVICE — 4-PORT MANIFOLD: Brand: NEPTUNE 2

## (undated) DEVICE — SUTURE STRATAFIX SPRL SZ 1 L14IN ABSRB VLT L48CM CTX 1/2 SXPD2B405

## (undated) DEVICE — TRAY CATH 16FR F FOAM SWAB SGL LAYR TY W/ BARDX IC COMPLT

## (undated) DEVICE — STERILE POLYISOPRENE POWDER-FREE SURGICAL GLOVES WITH EMOLLIENT COATING: Brand: PROTEXIS

## (undated) DEVICE — X-RAY SPONGES,16 PLY: Brand: DERMACEA

## (undated) DEVICE — REM POLYHESIVE ADULT PATIENT RETURN ELECTRODE: Brand: VALLEYLAB

## (undated) DEVICE — T5 HOOD WITH PEEL AWAY FACE SHIELD

## (undated) DEVICE — (D)PREP SKN CHLRAPRP APPL 26ML -- CONVERT TO ITEM 371833

## (undated) DEVICE — ZIMMER® STERILE DISPOSABLE TOURNIQUET CUFF WITH PLC, DUAL PORT, SINGLE BLADDER, 34 IN. (86 CM)

## (undated) DEVICE — SYRINGE MED 20ML STD CLR PLAS LUERLOCK TIP N CTRL DISP

## (undated) DEVICE — Z CONVERTED USE 2271043 CONTAINER SPEC COLL 4OZ SCR ON LID PEEL PCH

## (undated) DEVICE — HANDPIECE SET WITH BONE CLEANING TIP AND SUCTION TUBE: Brand: INTERPULSE

## (undated) DEVICE — SOLUTION IRRIG 3000ML 0.9% SOD CHL FLX CONT 0797208] ICU MEDICAL INC]

## (undated) DEVICE — SOLUTION IRRIG 1000ML H2O STRL BLT

## (undated) DEVICE — Device